# Patient Record
Sex: MALE | Race: WHITE | NOT HISPANIC OR LATINO | Employment: OTHER | URBAN - METROPOLITAN AREA
[De-identification: names, ages, dates, MRNs, and addresses within clinical notes are randomized per-mention and may not be internally consistent; named-entity substitution may affect disease eponyms.]

---

## 2017-03-27 ENCOUNTER — TRANSFERRED RECORDS (OUTPATIENT)
Dept: HEALTH INFORMATION MANAGEMENT | Facility: CLINIC | Age: 62
End: 2017-03-27

## 2017-03-28 ENCOUNTER — MEDICAL CORRESPONDENCE (OUTPATIENT)
Dept: HEALTH INFORMATION MANAGEMENT | Facility: CLINIC | Age: 62
End: 2017-03-28

## 2017-04-20 ENCOUNTER — ONCOLOGY VISIT (OUTPATIENT)
Dept: ONCOLOGY | Facility: CLINIC | Age: 62
End: 2017-04-20
Attending: GENETIC COUNSELOR, MS
Payer: COMMERCIAL

## 2017-04-20 DIAGNOSIS — Z85.46 HISTORY OF PROSTATE CANCER: ICD-10-CM

## 2017-04-20 DIAGNOSIS — Z80.0 FAMILY HISTORY OF PANCREATIC CANCER: Primary | ICD-10-CM

## 2017-04-20 PROCEDURE — 96040 ZZH GENETIC COUNSELING, EACH 30 MINUTES: CPT | Performed by: GENETIC COUNSELOR, MS

## 2017-04-20 NOTE — PROGRESS NOTES
4/20/2017    Referring Provider: Dean Nissen, MD    Presenting Information:   I met with Pradeep Junior today for genetic counseling at the Cancer Risk Management Program at the [facility] to discuss his family history of pancreatic cancer.  He is here today to review this history, cancer screening recommendations, and available genetic testing options.    Personal History:  Pradeep is a 61 year old male.  He was diagnosed with prostate cancer at age 51; treatment included prostatectomy.      Pradeep began having colonoscopies at the age of 51 and reports a history of fewer than 5 total colon polyps. His most recent colonoscopy in 2013 was reportedly normal and follow-up was recommended in 5 years.  He reports a history of several non-cancerous skin lesions on his head, back and chest.   He does not regularly do any other cancer screening at this time.  Pradeep is a non-smoker and reported no concerns regarding alcohol use.    Family History: (Please see scanned pedigree for detailed family history information)    Pradeep has a sister recently diagnosed with pancreatic cancer at 65.    Pradeep's father was diagnosed with pancreatic cancer at 58 and passed away at 59.    Pradeep's paternal grandmother had two sister's diagnosed with pancreatic cancer.      Pradeep's maternal grandfather was diagnosed with brain cancer at 76 and passed away at 77.    Of note, Pradeep reported that two of his sister's have been diagnosed with Gilbert's Syndrome    His maternal ethnicity is English/Scandinavian. His paternal ethnicity is Wolof.  There is no known Ashkenazi Samaritan ancestry on either side of his family.     Discussion:    Pradeep's family history of pancreatic cancer in three generations is suggestive of a hereditary cancer syndrome.    We reviewed the features of sporadic, familial, and hereditary cancers.  A detailed handout regarding these features and the information we discussed was provided to Pradeep at the end of our appointment  today and can be found in the after visit summary.  Topics included: inheritance pattern, cancer risks, cancer screening recommendations, and also risks, benefits and limitations of testing.    We discussed the natural history and genetics of several pancreatic cancer susceptibility genes/syndromes, including hereditary breast and ovarian cancer syndrome caused by mutations in the BRCA1 and BRCA2 genes.  We discussed that there are additional genes that could cause increased risk of pancreatic cancer.  As many of these genes present with overlapping features in a family, it would be reasonable for Pradeep to consider panel genetic testing to analyze multiple genes at once.    Based on his family history of pancreatic cancer, Pradeep meets current National Comprehensive Cancer Network (NCCN) criteria for genetic testing of BRCA1 and BRCA2.      We discussed that genetic testing for pancreatic cancer susceptibility genes is typically most informative when it is first performed on a family member with a personal history of pancreatic cancer.  In this situation, we discussed that Pradeep's sister, who was diagnosed with pancreatic cancer at age 65, would be the best person to test first.  Testing is available to Pradeep, but with limitations.   If Pradeep pursues testing at this time and receives a negative result, this does not rule out the possibility of a hereditary cancer syndrome in his family.  Pradeep expressed interest in proceeding with testing if it is covered by his insurance at a reasonable price, however plans to first speak with his sister about the possibility of testing prior to having his blood drawn.      We discussed that genetic testing is available for 13 genes associated with increased risk for pancreatic cancer: PancNext (APC, CHIKIS, BRCA1, BRCA2, CDKN2A, EPCAM, MLH1, MSH2, MSH6, PALB2, PMS2, STK11, and TP53).  Due to his personal history of prostate cancer at a young age, we also reviewed that genetic testing is  available for 14 genes associated with increased risk for prostate cancer: ProstateNext (CHIKIS, BRCA1, BRCA2, CHEK2, EPCAM, HOXB13, MLH1, MSH2, MSH6, NBN, PALB2, PMS2, RAD51D, and TP53).  Today Pradeep expressed interest in genetic testing which addresses his family history of pancreatic cancer (PancNext).    We discussed that most of the genes in the PancNext panel are associated with specific hereditary cancer syndromes and have published management guidelines: Hereditary Breast and Ovarian Cancer syndrome (BRCA1, BRCA2), Mccoy syndrome (EPCAM, MLH1, MSH2, MSH6, PMS2), Familial Atypical Multiple Mole Melanoma syndrome (CDKN2A), Li Fraumeni syndrome (TP53), Familial Adenomatous Polyposis (APC), and Peutz-Jeghers syndrome (STK11).   The remaining genes (CHIKIS, PALB2) are associated with increased pancreatic cancer risk and may allow us to make medical recommendations when mutations are identified.  These genes do have guidelines for breast cancer risk management for women.  Pradeep elected to pursue a benefits analysis through [employer] to better understand his out of pocket cost for the PancNext gene panel.  He was provided with a detailed brochure from [employer] explaining the PancNext testing.    Due to Pradeep s family history of pancreatic cancer (including two first degree relatives), screening for pancreatic cancer may be considered.  The International Cancer of the Pancreas Screening Consortium has recommended pancreatic cancer screening with endoscopic ultrasonography (EUS) and/or MRI/magnetic resonance cholangiopancreatography for individuals who have a first degree relative and one additional family member diagnosed with pancreatic cancer (Sam et al, Gut 2013; 62: 339-347)(Panchito S, et al., Am J Gastroenterol 2015; 110:223-262).  We discussed the option of Pradeep meeting with a provider from the Cancer Risk Management Program to facilitate this screening with the option to add additional screening if  indicated by genetic test results. A referral was made to see DAMIEN Gill, LUCAS, for this service.    We discussed that the information from genetic testing may determine additional cancer screening recommendations (i.e. more frequent colonoscopies, pancreatic cancer screening, annual dermatologic exams, etc.) for Pradeep and his relatives.  These recommendations will be discussed in detail if/when genetic testing is completed.    Plan:  1) Today Pradeep elected to pursue a benefits analysis through [employer] to better understand his out of pocket cost for the PancNext gene panel.  2) Pradeep will be contacted with his expected out of pocket cost when available.  3) If Pradeep is comfortable with this estimated out of pocket cost, he will return to clinic to sign the consent form and have his blood drawn.    Face to face time: 45 minutes    Janina Cordova MS, [facility] [place] [facility]  Certified Genetic Counselor  [phone]

## 2017-04-20 NOTE — LETTER
Cancer Risk Management  Program Locations    [facility]  Mailing Address  Cancer Risk Management Program  [facility]  [address]  [place] [address]    New patient appointments  [phone]  April 20, 2017    Pradeep Junior  [address] [facility] [address]      Dear Pradeep,    It was a pleasure meeting with you at the [facility] on 4/20/2017.  Here is a copy of the progress note from your recent genetic counseling visit to the Cancer Risk Management Program.  If you have any additional questions, please feel free to call.    Referring Provider: Dean Nissen, MD    Presenting Information:   I met with Pradeep Junior today for genetic counseling at the Cancer Risk Management Program at the [facility] to discuss his family history of pancreatic cancer.  He is here today to review this history, cancer screening recommendations, and available genetic testing options.    Personal History:  Pradeep is a 61 year old male.  He was diagnosed with prostate cancer at age 51; treatment included prostatectomy.      Pradeep began having colonoscopies at the age of 51 and reports a history of fewer than 5 total colon polyps. His most recent colonoscopy in 2013 was reportedly normal and follow-up was recommended in 5 years.  He reports a history of several non-cancerous skin lesions on his head, back and chest.   He does not regularly do any other cancer screening at this time.  Pradeep is a non-smoker and reported no concerns regarding alcohol use.    Family History: (Please see scanned pedigree for detailed family history information)    Pradeep has a sister recently diagnosed with pancreatic cancer at 65.    Pradeep's father was diagnosed with pancreatic cancer at 58 and passed away at 59.    Pradeep's  paternal grandmother had two sister's diagnosed with pancreatic cancer.      Pardeep's maternal grandfather was diagnosed with brain cancer at 76 and passed away at 77.    Of note, Pradeep reported that two of his sister's have been diagnosed with Gilbert's Syndrome    His maternal ethnicity is English/Scandinavian. His paternal ethnicity is Setswana.  There is no known Ashkenazi Alevism ancestry on either side of his family.     Discussion:    Pradeep's family history of pancreatic cancer in three generations is suggestive of a hereditary cancer syndrome.    We reviewed the features of sporadic, familial, and hereditary cancers.  A detailed handout regarding these features and the information we discussed was provided to Pradeep at the end of our appointment today and can be found in the after visit summary.  Topics included: inheritance pattern, cancer risks, cancer screening recommendations, and also risks, benefits and limitations of testing.    We discussed the natural history and genetics of several pancreatic cancer susceptibility genes/syndromes, including hereditary breast and ovarian cancer syndrome caused by mutations in the BRCA1 and BRCA2 genes.  We discussed that there are additional genes that could cause increased risk of pancreatic cancer.  As many of these genes present with overlapping features in a family, it would be reasonable for Pradeep to consider panel genetic testing to analyze multiple genes at once.    Based on his family history of pancreatic cancer, Pradeep meets current National Comprehensive Cancer Network (NCCN) criteria for genetic testing of BRCA1 and BRCA2.      We discussed that genetic testing for pancreatic cancer susceptibility genes is typically most informative when it is first performed on a family member with a personal history of pancreatic cancer.  In this situation, we discussed that Pradeep's sister, who was diagnosed with pancreatic cancer at age 65, would be the best person to test first.   Testing is available to Pradeep, but with limitations.   If Pradeep pursues testing at this time and receives a negative result, this does not rule out the possibility of a hereditary cancer syndrome in his family.  Pradeep expressed interest in proceeding with testing if it is covered by his insurance at a reasonable price, however plans to first speak with his sister about the possibility of testing prior to having his blood drawn.      We discussed that genetic testing is available for 13 genes associated with increased risk for pancreatic cancer: PancNext (APC, CHIKIS, BRCA1, BRCA2, CDKN2A, EPCAM, MLH1, MSH2, MSH6, PALB2, PMS2, STK11, and TP53).  Due to his personal history of prostate cancer at a young age, we also reviewed that genetic testing is available for 14 genes associated with increased risk for prostate cancer: ProstateNext (CHIKIS, BRCA1, BRCA2, CHEK2, EPCAM, HOXB13, MLH1, MSH2, MSH6, NBN, PALB2, PMS2, RAD51D, and TP53).  Today Pradeep expressed interest in genetic testing which addresses his family history of pancreatic cancer (PancNext).    We discussed that most of the genes in the PancNext panel are associated with specific hereditary cancer syndromes and have published management guidelines: Hereditary Breast and Ovarian Cancer syndrome (BRCA1, BRCA2), Mccoy syndrome (EPCAM, MLH1, MSH2, MSH6, PMS2), Familial Atypical Multiple Mole Melanoma syndrome (CDKN2A), Li Fraumeni syndrome (TP53), Familial Adenomatous Polyposis (APC), and Peutz-Jeghers syndrome (STK11).   The remaining genes (CHIKIS, PALB2) are associated with increased pancreatic cancer risk and may allow us to make medical recommendations when mutations are identified.  These genes do have guidelines for breast cancer risk management for women.  Pradeep elected to pursue a benefits analysis through [employer] to better understand his out of pocket cost for the PancNext gene panel.  He was provided with a detailed brochure from [employer] explaining the  PancNext testing.    Due to Pradeep s family history of pancreatic cancer (including two first degree relatives), screening for pancreatic cancer may be considered.  The International Cancer of the Pancreas Screening Consortium has recommended pancreatic cancer screening with endoscopic ultrasonography (EUS) and/or MRI/magnetic resonance cholangiopancreatography for individuals who have a first degree relative and one additional family member diagnosed with pancreatic cancer (Sam et al, Gut 2013; 62: 339-347)(Panchito S, et al., Am J Gastroenterol 2015; 110:223-262).  We discussed the option of Pradeep meeting with a provider from the Cancer Risk Management Program to facilitate this screening with the option to add additional screening if indicated by genetic test results. A referral was made to see DAMIEN Gill, LUCAS, for this service.    We discussed that the information from genetic testing may determine additional cancer screening recommendations (i.e. more frequent colonoscopies, pancreatic cancer screening, annual dermatologic exams, etc.) for Pradeep and his relatives.  These recommendations will be discussed in detail if/when genetic testing is completed.    Plan:  1) Today Pradeep elected to pursue a benefits analysis through [employer] to better understand his out of pocket cost for the PancNext gene panel.  2) Pradeep will be contacted with his expected out of pocket cost when available.  3) If Pradeep is comfortable with this estimated out of pocket cost, he will return to clinic to sign the consent form and have his blood drawn.    Janina Cordova MS, [place]  Certified Genetic Counselor  [phone]

## 2017-04-20 NOTE — MR AVS SNAPSHOT
After Visit Summary   4/20/2017    Pradeep Junior    MRN: [id number]           Patient Information     Date Of Birth          1955        Visit Information        Provider Department      4/20/2017 11:15 AM Janina Cordova [facility] Cancer Risk Management Program        Today's Diagnoses     Family history of pancreatic cancer    -  1    History of prostate cancer           Follow-ups after your visit        Additional Services     CANCER RISK MGMT/CANCER GENETIC COUNSELING REFERRAL       Your provider has referred you to the Cancer Risk Management Program to meet with STEPHANE Gill to discuss high risk pancreatic cancer screening    Reason for Referral: Family history of pancreatic cancer, including two first degree relatives (sister diagnosed at 65, father diagnosed at 58)    We have a sent a notice to a staff member of the Cancer Risk Management Program to give you a call to assist with scheduling your appointment.  You may also call  [phone] ([phone]) to initiate scheduling.    Please be aware that coverage of these services is subject to the terms and limitations of your health insurance plan.  Call member services at your health plan with any benefit or coverage questions.      Please bring the completed family history sheet to your appointment in addition to any available outside medical records documenting your cancer diagnosis.                  Who to contact     If you have questions or need follow up information about today's clinic visit or your schedule please contact CANCER RISK MANAGEMENT PROGRAM directly at [phone].  Normal or non-critical lab and imaging results will be communicated to you by MyChart, letter or phone within 4 business days after the clinic has received the results. If you do not hear from us within 7 days, please contact the clinic through MyChart or phone. If you have a critical or abnormal lab result, we will notify you by phone as soon  "as possible.  Submit refill requests through [employer] or call your pharmacy and they will forward the refill request to us. Please allow 3 business days for your refill to be completed.          Additional Information About Your Visit        [employer] Information     [employer] lets you send messages to your doctor, view your test results, renew your prescriptions, schedule appointments and more. To sign up, go to www.[facility]/[employer] . Click on \"Log in\" on the left side of the screen, which will take you to the Welcome page. Then click on \"Sign up Now\" on the right side of the page.     You will be asked to enter the access code listed below, as well as some personal information. Please follow the directions to create your username and password.     Your access code is: [email or web address]  Expires: 2017 12:26 PM     Your access code will  in 90 days. If you need help or a new code, please call your [place] clinic or [phone].        Care EveryWhere ID     This is your Care EveryWhere ID. This could be used by other organizations to access your [place] medical records  [id number]         Blood Pressure from Last 3 Encounters:   No data found for BP    Weight from Last 3 Encounters:   No data found for Wt              We Performed the Following     CANCER RISK MGMT/CANCER GENETIC COUNSELING REFERRAL        Primary Care Provider    None Specified       No primary provider on file.        Thank you!     Thank you for choosing CANCER RISK MANAGEMENT PROGRAM  for your care. Our goal is always to provide you with excellent care. Hearing back from our patients is one way we can continue to improve our services. Please take a few minutes to complete the written survey that you may receive in the mail after your visit with us. Thank you!             Your Updated Medication List - Protect others around you: Learn how to safely use, store and throw away your medicines at www.disposemymeds.org.      Notice  As of " 4/20/2017 12:26 PM    You have not been prescribed any medications.

## 2017-04-24 NOTE — PATIENT INSTRUCTIONS
Assessing Cancer Risk  Cancer is a common disease.  It is expected that one in every three people in the [place] will develop cancer over their lifetime.  The vast majority of cancers are considered sporadic and not primarily due to an inherited factor.  Individuals can develop cancer due to aging, chance events, environmental exposures or lifestyles.  Mainly inherited factors (altered cancer susceptibility genes) are responsible for only a small number of cancer cases, approximately 5-10%.    There are several features that are likely to be present in a family that has an inherited alteration in a cancer susceptibility gene. These include:    Several people with the same or related types of cancer    Cancers diagnosed at a young age (before age 50)    Individuals with more than one primary cancer    Multiple generations of the family affected with cancer    Families that do not exhibit most of these features are unlikely to carry an altered cancer susceptibility gene, and genetic testing is not usually warranted.      Categories of Cancer        In some families, we see more cancer than we would expect based on chance alone.  It is likely that this familial clustering of cancer is due to a combination of environmental and minor genetic factors that are not yet fully understood.  Individuals from these families are likely to be at some increased risk for cancer.  In these families, we cannot conclusively rule out the possibility that inherited cancer is present. For this reason, more frequent cancer screening may be recommended even if genetic testing is not.    Resources  American Cancer Society (ACS) cancer.org     Please call us if you have any questions or concerns.     Cancer Risk Management Program [phone] ([phone])  ? Migdalia Kirby MS, [facility]  [phone]  ? Magalys Choi MS, [facility]  [phone]  ? Eleonora Burris MS, [facility]  [phone]  ? Janina Cordova, MS, [facility]  [phone]  ? Krista Wallace MS,  [facility]  [phone]

## 2017-05-11 ENCOUNTER — ONCOLOGY VISIT (OUTPATIENT)
Dept: ONCOLOGY | Facility: CLINIC | Age: 62
End: 2017-05-11
Attending: CLINICAL NURSE SPECIALIST
Payer: COMMERCIAL

## 2017-05-11 VITALS
HEART RATE: 61 BPM | DIASTOLIC BLOOD PRESSURE: 58 MMHG | RESPIRATION RATE: 16 BRPM | OXYGEN SATURATION: 97 % | WEIGHT: 142 LBS | TEMPERATURE: 97.7 F | SYSTOLIC BLOOD PRESSURE: 143 MMHG

## 2017-05-11 DIAGNOSIS — Z80.0 FAMILY HISTORY OF PANCREATIC CANCER: Primary | ICD-10-CM

## 2017-05-11 PROCEDURE — 99202 OFFICE O/P NEW SF 15 MIN: CPT | Performed by: CLINICAL NURSE SPECIALIST

## 2017-05-11 PROCEDURE — 99211 OFF/OP EST MAY X REQ PHY/QHP: CPT

## 2017-05-11 RX ORDER — ASPIRIN 325 MG
325 TABLET ORAL DAILY
COMMUNITY

## 2017-05-11 ASSESSMENT — PAIN SCALES - GENERAL: PAINLEVEL: NO PAIN (0)

## 2017-05-11 NOTE — LETTER
Cancer Risk Management  Program Locations    [facility]  [facility]  [facility]  [facility]  Mailing Address  Cancer Risk Management Program  [facility]  [address]  [place] [address]    New patient appointments  [phone]  May 11, 2017    Pradeep Junior  [address] [facility] [address]      Dear Pradeep,    It was a pleasure meeting with you today.  Below is a copy of my note from our visit   Please feel free to contact me if you have any questions or concerns.    Oncology Risk Management Consultation:  Date on this visit: 5/11/2017    Pradeep Junior is referred by Janina Cordova, Certified Genetic Counselor, for an oncology risk management consultation. He requires evaluation for his risk of cancer secondary to having a family history of pancreatic cancer in his father, sister and 4 paternal great aunts.      Primary Physician: No primary care provider on file.     History Of Present Illness:  Mr. Junior is a very pleasant, healthy 61 year old male who presents with family history of pancreatic cancer.     Genetic testing:  No genetic testing to date. He visited with Janina Cordova, Certified Genetic Counselor, recently, who assessed his family history.    Of note, his sister, who had pancreatic cancer at age 65 (no history of alcohol use or smoking) is waiting for genetic test results from a 32-gene [employer] genetics panel.  Depending on those results, Pradeep will decide whether he should have testing and for which genetic mutation.    Pertinent history:  GENERAL: No change in weight, sleep or appetite.  Normal energy.  No fever or chills  EYES: Positive for strabismus surgery in childhood.  ENT: No problems with ears, nose or throat.  No difficulty swallowing.  RESP: No coughing, wheezing or shortness of breath  CV: No chest pains or  palpitations. Positive for history of hyperlipidemia.  GI: No nausea, vomiting,  heartburn, epigastric or abdominal pain, diarrhea, constipation or change in bowel habits  : Positive for prostate cancer in ; s/p radical prostatectomy with nerve sparing technique. Positive for history of kidney stones.No current urinary frequency or dysuria, bladder or kidney problems  MUSCULOSKELETAL: Positive for history of Paget's disease of bone. No significant muscle or joint pains  NEUROLOGIC: No headaches, numbness, tingling, weakness, problems with balance or coordination  PSYCHIATRIC: No problems with anxiety, depression or mental health  HEME/IMMUNE/ALLERGY: No history of bleeding or clotting problems or anemia.  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     Past Surgical History:   Procedure Laterality Date     C LAPAROSCOPY, SURGICAL PROSTATECTOMY, RETROPUBIC RADICAL, W/NERVE SPARING       COLONOSCOPY  10/20/2013     STRABISMUS SURGERY  89       Allergies:  Allergies as of 2017     (No Known Allergies)       Current Medications:  Current Outpatient Prescriptions   Medication Sig Dispense Refill     aspirin 325 MG tablet Take 325 mg by mouth daily       ATORVASTATIN CALCIUM PO Take 40 mg by mouth daily          Family History:  Family History   Problem Relation Age of Onset     Pancreatic Cancer Father 58      at 59     Pancreatic Cancer Sister 65     nonsmoker, nondrinker, genetic test results pending (2017)     Other - See  Comments Sister      Gilbert's Syndrome     Other - See Comments Brother 54     pancreatic cyst     Colon Polyps Brother 25     Brain Cancer Maternal Grandfather 76      at 77     Pancreatic Cancer Other      4 paternal great aunts with pancreatic cancer ranging grom 40s to 60s     DIABETES Daughter      Type 1 diabetes     Other - See Comments Sister      Praneethbert's Syndrome     Colon Polyps Sister      Colon Polyps Brother 66       Social History:  Social History     Social History     Marital status:      Spouse name: Mary     Number of children: 2     Years of education: N/A     Occupational History     Realtor      Social History Main Topics     Smoking status: Current Some Day Smoker     Types: Cigars     Start date: 2007     Smokeless tobacco: Never Used      Comment: 2 cigars only while golfing, states he doesn't need help quitting     Alcohol use 1.8 oz/week     3 Glasses of wine per week     Drug use: No     Sexual activity: Not on file     Other Topics Concern     Not on file     Social History Narrative     No narrative on file       Physical Exam:  /58 (BP Location: Right arm, Patient Position: Chair, Cuff Size: Adult Regular)  Pulse 61  Temp 97.7  F (36.5  C) (Oral)  Resp 16  Wt 64.4 kg (142 lb)  SpO2 97%    GENERAL APPEARANCE: healthy, alert and no distress  PSYCHOSOCIAL: Judgement and insight: Normal. Orientation to person, place and time. Recent and remote memory. Normal mood and affect.    Laboratory/Imaging Studies  No results found for this or any previous visit.    ASSESSMENT  Today, Pradeep has no immediate complaints of GI issues.  He is interested in having an EUS and has looked up information about it online.  He will follow up with Janina Cordova if his sister is positive for a genetic mutation, as she is having 32 genes tested for mutations.     Pradeep has smoked cigars while golfing for the last 7 years or so; he states he only has 2 per day and only during the summer  months. We discussed that it would be to his advantage to quit this habit and he states he will and declined any type of support for quitting.  He drinks about 3 glasses of wine per week. Of note in his family history, his daughter has Type 1 Diabetes; there is no reported pancreatitis in his immediate siblings and no other diabetes that he is aware of at this time.    We reviewed his eligibility for pancreatic screening, based on the CAPS guidelines. I am referring him to Dr. Oliverio Tapia for a consultation and plan.    I spent 20 minutes with the patient with greater that 50% of it in counseling and coordinating care as documented above.    Janine Alfredo, APRN-CNS, OCN, ANG-BC  Clinical Nurse Specialist  Cancer Risk Management Program  [address]  [place] [address]    phone:  [phone]  Pager: [phone]  fax: [phone]    Cc: Dr. Oliverio Cordova, Certified Genetic Counselor        Oncology Rooming Note    May 11, 2017 11:05 AM   Pradeep Junior is a 61 year old male who presents for:    Chief Complaint   Patient presents with     Oncology Clinic Visit     screening for CA     Initial Vitals: There were no vitals taken for this visit. There is no height or weight on file to calculate BMI. There is no height or weight on file to calculate BSA.  Data Unavailable Comment: Data Unavailable   No LMP for male patient.  Allergies reviewed: Yes  Medications reviewed: Yes  May need to check atrovastatin drug.   Medications: Medication refills not needed today.  Pharmacy name entered into EPIC: Data Unavailable    Clinical concerns: none Janine MENDOZA  was notified.    10 minutes for nursing intake (face to face time)     Mile Quinn MA            \

## 2017-05-11 NOTE — PROGRESS NOTES
Oncology Risk Management Consultation:  Date on this visit: 5/11/2017    Pradeep Junior is referred by Janina Cordova, Certified Genetic Counselor, for an oncology risk management consultation. He requires evaluation for his risk of cancer secondary to having a family history of pancreatic cancer in his father, sister and 4 paternal great aunts.      Primary Physician: No primary care provider on file.     History Of Present Illness:  Mr. Junior is a very pleasant, healthy 61 year old male who presents with family history of pancreatic cancer.     Genetic testing:  No genetic testing to date. He visited with Janina Cordova, Certified Genetic Counselor, recently, who assessed his family history.    Of note, his sister, who had pancreatic cancer at age 65 (no history of alcohol use or smoking) is waiting for genetic test results from a 32-gene [employer] genetics panel.  Depending on those results, Pradeep will decide whether he should have testing and for which genetic mutation.    Pertinent history:  GENERAL: No change in weight, sleep or appetite.  Normal energy.  No fever or chills  EYES: Positive for strabismus surgery in childhood.  ENT: No problems with ears, nose or throat.  No difficulty swallowing.  RESP: No coughing, wheezing or shortness of breath  CV: No chest pains or palpitations. Positive for history of hyperlipidemia.  GI: No nausea, vomiting,  heartburn, epigastric or abdominal pain, diarrhea, constipation or change in bowel habits  : Positive for prostate cancer in 2007; s/p radical prostatectomy with nerve sparing technique. Positive for history of kidney stones.No current urinary frequency or dysuria, bladder or kidney problems  MUSCULOSKELETAL: Positive for history of Paget's disease of bone. No significant muscle or joint pains  NEUROLOGIC: No headaches, numbness, tingling, weakness, problems with balance or coordination  PSYCHIATRIC: No problems with anxiety, depression or mental  health  HEME/IMMUNE/ALLERGY: No history of bleeding or clotting problems or anemia.  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     Past Surgical History:   Procedure Laterality Date     C LAPAROSCOPY, SURGICAL PROSTATECTOMY, RETROPUBIC RADICAL, W/NERVE SPARING       COLONOSCOPY  10/20/2013     STRABISMUS SURGERY         Allergies:  Allergies as of 2017     (No Known Allergies)       Current Medications:  Current Outpatient Prescriptions   Medication Sig Dispense Refill     aspirin 325 MG tablet Take 325 mg by mouth daily       ATORVASTATIN CALCIUM PO Take 40 mg by mouth daily          Family History:  Family History   Problem Relation Age of Onset     Pancreatic Cancer Father 58      at 59     Pancreatic Cancer Sister 65     nonsmoker, nondrinker, genetic test results pending (2017)     Other - See Comments Sister      Gilbert's Syndrome     Other - See Comments Brother 54     pancreatic cyst     Colon Polyps Brother 25     Brain Cancer Maternal Grandfather 76      at 77     Pancreatic Cancer Other      4 paternal great aunts with pancreatic cancer ranging grom 40s to 60s     DIABETES Daughter      Type 1 diabetes     Other - See Comments Sister      Gilbert's Syndrome     Colon Polyps Sister      Colon Polyps Brother 66       Social History:  Social History     Social History     Marital status:      Spouse name: Mary     Number of children: 2     Years of education: N/A     Occupational History     Realtor       Social History Main Topics     Smoking status: Current Some Day Smoker     Types: Cigars     Start date: 5/11/2007     Smokeless tobacco: Never Used      Comment: 2 cigars only while golfing, states he doesn't need help quitting     Alcohol use 1.8 oz/week     3 Glasses of wine per week     Drug use: No     Sexual activity: Not on file     Other Topics Concern     Not on file     Social History Narrative     No narrative on file       Physical Exam:  /58 (BP Location: Right arm, Patient Position: Chair, Cuff Size: Adult Regular)  Pulse 61  Temp 97.7  F (36.5  C) (Oral)  Resp 16  Wt 64.4 kg (142 lb)  SpO2 97%    GENERAL APPEARANCE: healthy, alert and no distress  PSYCHOSOCIAL: Judgement and insight: Normal. Orientation to person, place and time. Recent and remote memory. Normal mood and affect.    Laboratory/Imaging Studies  No results found for this or any previous visit.    ASSESSMENT  Today, Pradeep has no immediate complaints of GI issues.  He is interested in having an EUS and has looked up information about it online.  He will follow up with Janina Cordova if his sister is positive for a genetic mutation, as she is having 32 genes tested for mutations.     Pradeep has smoked cigars while golfing for the last 7 years or so; he states he only has 2 per day and only during the summer months. We discussed that it would be to his advantage to quit this habit and he states he will and declined any type of support for quitting.  He drinks about 3 glasses of wine per week. Of note in his family history, his daughter has Type 1 Diabetes; there is no reported pancreatitis in his immediate siblings and no other diabetes that he is aware of at this time.    We reviewed his eligibility for pancreatic screening, based on the CAPS guidelines. I am referring him to Dr. Oliverio Tapia for a consultation and plan.    I spent 20 minutes with the patient with greater that 50% of it in counseling and coordinating care as  documented above.    Janine Alfredo, APRN-CNS, OCN, ANG-BC  Clinical Nurse Specialist  Cancer Risk Management Program  [address]  [place] [address]    phone:  [phone]  Pager: [phone]  fax: [phone]    Cc: Dr. Oliverio Cordova, Certified Genetic Counselor

## 2017-05-11 NOTE — PROGRESS NOTES
Oncology Rooming Note    May 11, 2017 11:05 AM   Pradeep Junior is a 61 year old male who presents for:    Chief Complaint   Patient presents with     Oncology Clinic Visit     screening for CA     Initial Vitals: There were no vitals taken for this visit. There is no height or weight on file to calculate BMI. There is no height or weight on file to calculate BSA.  Data Unavailable Comment: Data Unavailable   No LMP for male patient.  Allergies reviewed: Yes  Medications reviewed: Yes  May need to check atrovastatin drug.   Medications: Medication refills not needed today.  Pharmacy name entered into EPIC: Data Unavailable    Clinical concerns: none Janine MENDOZA  was notified.    10 minutes for nursing intake (face to face time)     Mile Quinn MA            \

## 2017-05-11 NOTE — MR AVS SNAPSHOT
"              After Visit Summary   2017    Pradeep Junior    MRN: [id number]           Patient Information     Date Of Birth          1955        Visit Information        Provider Department      2017 11:00 AM Janine Alfredo APRN CNS Cancer Risk Management Program        Today's Diagnoses     Family history of pancreatic cancer    -  1       Follow-ups after your visit        Additional Services     GASTROENTEROLOGY ADULT REF CONSULT ONLY       Family history of pancreatic cancer in sister at 65, father at 58 ( at 59), paternal great aunt (three generations).   No genetic testing done to date. Patient considering cost of genetic testing at this time.                  Who to contact     If you have questions or need follow up information about today's clinic visit or your schedule please contact CANCER RISK MANAGEMENT PROGRAM directly at [phone].  Normal or non-critical lab and imaging results will be communicated to you by GoSpotCheckhart, letter or phone within 4 business days after the clinic has received the results. If you do not hear from us within 7 days, please contact the clinic through GoSpotCheckhart or phone. If you have a critical or abnormal lab result, we will notify you by phone as soon as possible.  Submit refill requests through Vatler or call your pharmacy and they will forward the refill request to us. Please allow 3 business days for your refill to be completed.          Additional Information About Your Visit        MyChart Information     Vatler lets you send messages to your doctor, view your test results, renew your prescriptions, schedule appointments and more. To sign up, go to [email or web address] . Click on \"Log in\" on the left side of the screen, which will take you to the Welcome page. Then click on \"Sign up Now\" on the right side of the page.     You will be asked to enter the access code listed below, as well as some personal information. Please follow the " directions to create your username and password.     Your access code is: [email or web address]  Expires: 2017 12:26 PM     Your access code will  in 90 days. If you need help or a new code, please call your [facility] clinic or [phone].        Care EveryWhere ID     This is your Care EveryWhere ID. This could be used by other organizations to access your [facility] medical records  [id number]        Your Vitals Were     Pulse Temperature Respirations Pulse Oximetry          61 97.7  F (36.5  C) (Oral) 16 97%         Blood Pressure from Last 3 Encounters:   17 143/58    Weight from Last 3 Encounters:   17 64.4 kg (142 lb)              We Performed the Following     GASTROENTEROLOGY ADULT REF CONSULT ONLY        Primary Care Provider    None Specified       No primary provider on file.        Thank you!     Thank you for choosing CANCER RISK MANAGEMENT PROGRAM  for your care. Our goal is always to provide you with excellent care. Hearing back from our patients is one way we can continue to improve our services. Please take a few minutes to complete the written survey that you may receive in the mail after your visit with us. Thank you!             Your Updated Medication List - Protect others around you: Learn how to safely use, store and throw away your medicines at www.disposemymeds.org.          This list is accurate as of: 17 12:28 PM.  Always use your most recent med list.                   Brand Name Dispense Instructions for use    aspirin 325 MG tablet      Take 325 mg by mouth daily       ATORVASTATIN CALCIUM PO      Take 40 mg by mouth daily

## 2017-05-11 NOTE — Clinical Note
I am sending you this jaime gentleman who has a father and sister with pancreatic cancer. Father . Sister's genetic test results pending. Also family history of 4 paternal great aunts with pancreatic cancer. Red's Syndrome in 2 sisters. Brother w/pancreatic cyst on EUS. Daughter w/Type 1 Diabetes. No pancreatitis in family we know of.  No personal symptoms. Great livan. Wants screening.   Thank you. One of these days we'll have to catch up.  mentioned that he'd like to do some research on pancreas and genetics - maybe you 2 could collaborate?    Janine Alfredo, APRN-CNS, OCN, ANG-BC Clinical Nurse Specialist Cancer Risk Management Program [address] [place] [address]  phone:  [phone] Pager: [phone] fax: [phone]

## 2017-05-15 ENCOUNTER — CARE COORDINATION (OUTPATIENT)
Dept: GASTROENTEROLOGY | Facility: CLINIC | Age: 62
End: 2017-05-15

## 2017-05-15 DIAGNOSIS — Z80.0 FAMILY HISTORY OF PANCREATIC CANCER: Primary | ICD-10-CM

## 2017-05-15 NOTE — PROGRESS NOTES
Case reviewed  Contrasted CT of the abdomen, pancreas protcol  Non urgent clinic visit with probable EUS to follow  Thanks    Oliverio Tapia MD PhD  Director of Endoscopy   of Medicine  Interventional and Therapeutic Endoscopy    [facility]  Division of Gastroenterology and Hepatology  [facility] [address]  [address], [place] [address]    New Consultations  [phone]  Procedure Scheduling [phone]  Clinical Nurse Coordinator [phone]  Clinical Fax   [phone]  Administrative   [phone]  Administrative Fax  [phone]

## 2017-05-15 NOTE — PROGRESS NOTES
Advanced Endoscopy Clinic Intake:    Referring provider: Dr. Janine Alfredo at [facility]  Dr. Janine Alfredo, ph: [phone], Fax: [phone]     Referred to: [facility] Advanced Endoscopy providers   Requested provider (if specified): Dr. Tapia     Referral Received: 5/11/2017    Records received: In EPIC     MD review date:   Sent to be reviewed by Dr. Tapia                            Requested procedure: Clinic Visit    Evaluation for: Family History of Pancreatic Cancer     Clinical History (per RN review of records provided):   5/11/2017 Per Dr. Alfredo's notes: He requires evaluation for his risk of cancer secondary to having a family history of pancreatic cancer in his father, sister and 4 paternal great aunts.     Recommendations/Orders:    Patient aware of request for clin consultation and ok to be contacted to schedule. Yes           Per recommendations by Dr. Tapia:  1. CT panc protocol   2. Non-urgent clinic visit   3. Probable EUS to follow.     Order placed and sent to scheduling.     Called Pradeep to explain plan, he is amenable to plan and would like to proceed. He is aware someone from scheduling will call him with dates and times.   Nereida BOYD, RN Coordinator  Dr. Wang, Dr. Tapia & Dr. Fritz  Pancreas~Biliary  [phone] #4

## 2017-06-13 ENCOUNTER — TELEPHONE (OUTPATIENT)
Dept: GASTROENTEROLOGY | Facility: CLINIC | Age: 62
End: 2017-06-13

## 2017-06-13 NOTE — TELEPHONE ENCOUNTER
Pradeep is informed that he is scheduled on 8/25 at 8:45 AM for a clinic consult.  He knows he is scheduled for an EUS on 08/28/ at 2 PM with an arrival time of 12 PM.  Offered to get Pradeep scheduled for a PAC appointment after clinic consult in preparation for his procedure but he declined and plans to get one done locally.   He would also like to have his CT done locally. Sent in basket for Nereida ALEJANDRO.  All instructions will be sent to Pradeep at his address listed in EPIC.     06/13/2017  243p

## 2017-06-15 ENCOUNTER — CARE COORDINATION (OUTPATIENT)
Dept: GASTROENTEROLOGY | Facility: CLINIC | Age: 62
End: 2017-06-15

## 2017-06-15 NOTE — PROGRESS NOTES
Pradeep would like his CT sent to his PCP Dr. Nissen at [facility] [place]. Phone: [phone], Fx [phone]  Faxed Ct order to his PCP. Advised him to get CT done in July since his procedure is not until August.     Nereida BOYD, RN Coordinator  Dr. Wang, Dr. Tapia & Dr. Feng   Pancreas~Biliary  [phone] #4

## 2017-06-15 NOTE — PROGRESS NOTES
Message left for Pradeep: per scheduling he would prefer to get his CT done locally. There is no PCP listed in his records.   Left message for him to call back to find out PCP and also if that is where he would like his CT sent.   Advised will need CT done prior to moving forward with anything else in regards to his referral.   Contact information left for him to call.     Nereida BOYD RN Coordinator  Dr. Wang, Dr. Tapia & Dr. Feng   Pancreas~Biliary  [phone] #4

## 2017-06-16 NOTE — PROGRESS NOTES
Pradeep called and said CT order was not received, new fax number given to send it to. Fax: [phone]    Resent and Pradeep will call if there are problems.     Nereida BOYD RN Coordinator  Dr. Wang, Dr. Tapia & Dr. Feng   Pancreas~Biliary  [phone] #4

## 2017-07-11 ENCOUNTER — TRANSFERRED RECORDS (OUTPATIENT)
Dept: HEALTH INFORMATION MANAGEMENT | Facility: CLINIC | Age: 62
End: 2017-07-11

## 2017-07-11 ENCOUNTER — TELEPHONE (OUTPATIENT)
Dept: ONCOLOGY | Facility: CLINIC | Age: 62
End: 2017-07-11

## 2017-07-19 NOTE — TELEPHONE ENCOUNTER
7/11/2017    I called Pradeep today in order to discuss the pre-authorization for genetic testing that we had submitted through [employer]. After meeting with Pradeep on 4/20/2017, prior authorization was submitted through [employer] to better understand if genetic testing of the PancNext gene panel would be covered through his insurance company. Per [employer], Pradeep's expected out of pocket cost after a pre authorization is approved through his insurance company will be $1724.27. His insurance has currently denied coverage for this testing. I called Pradeep today in order to discuss the status of his insurance authorization and possible next steps.  Pradeep stated that his sister, who was diagnosed with pancreatic cancer, recently pursued genetic testing for a 30 gene panel which was negative/normal.  Pradeep plans to obtain a copy of her test report for review.      We do not have an explanation for Pradeep's family history of pancreatic cancer.  Because of that, it is important that he continue with cancer screening based on his personal and family history.  Following his genetic counseling visit on 4/20/2017, Pradeep met with Janine Alfredo, Clinical Nurse Specialist, to discuss screening and management options for pancreatic cancer. He now plans to follow up with Dr. Oliverio Tapia for a consultation and plan.  Pradeep felt comfortable declining genetic testing at this time.    Genetic testing is rapidly advancing, and new cancer susceptibility genes will most likely be identified in the future.  Therefore, I encouraged Pradeep to contact me annually or if there are changes in his personal or family history.  This may change how we assess his cancer risk, screening, and the testing we would offer.     Janina Cordova MS, [facility]  Certified Genetic Counselor  [phone]

## 2017-07-20 ENCOUNTER — DOCUMENTATION ONLY (OUTPATIENT)
Dept: GASTROENTEROLOGY | Facility: CLINIC | Age: 62
End: 2017-07-20

## 2017-07-20 NOTE — PROGRESS NOTES
Received CT on disc from [facility] along with final read. Will send disc to be downloaded and will scan in final read for CT.     Nereida BOYD RN Coordinator  Dr. Wang, Dr. Tapia & Dr. Feng   Advanced Endoscopy  [phone]

## 2017-08-20 ASSESSMENT — ENCOUNTER SYMPTOMS
NAIL CHANGES: 0
POOR WOUND HEALING: 0
SKIN CHANGES: 0

## 2017-08-25 ENCOUNTER — OFFICE VISIT (OUTPATIENT)
Dept: GASTROENTEROLOGY | Facility: CLINIC | Age: 62
End: 2017-08-25

## 2017-08-25 VITALS
TEMPERATURE: 97.5 F | SYSTOLIC BLOOD PRESSURE: 139 MMHG | WEIGHT: 138.4 LBS | DIASTOLIC BLOOD PRESSURE: 80 MMHG | OXYGEN SATURATION: 98 % | HEART RATE: 56 BPM

## 2017-08-25 DIAGNOSIS — Z80.0 FAMILY HISTORY OF PANCREATIC CANCER: Primary | ICD-10-CM

## 2017-08-25 ASSESSMENT — PAIN SCALES - GENERAL: PAINLEVEL: NO PAIN (0)

## 2017-08-25 NOTE — MR AVS SNAPSHOT
After Visit Summary   8/25/2017    Pradeep Junior    MRN: [id number]           Patient Information     Date Of Birth          1955        Visit Information        Provider Department      8/25/2017 8:45 AM Oliverio Tapia MD [facility]creas [facility]iliary        Today's Diagnoses     Family history of pancreatic cancer    -  1       Follow-ups after your visit        Your next 10 appointments already scheduled     Aug 28, 2017   Procedure with Oliverio Tapia MD   [facility], [place], Same Day Surgery (--)    [address]   [place] [id number] [phone]              Who to contact     Please call your clinic at [phone] to:    Ask questions about your health    Make or cancel appointments    Discuss your medicines    Learn about your test results    Speak to your doctor   If you have compliments or concerns about an experience at your clinic, or if you wish to file a complaint, please contact [facility] Patient Relations at [phone] or email us at [email or web address]         Additional Information About Your Visit        MyChart Information     [employer] gives you secure access to your electronic health record. If you see a primary care provider, you can also send messages to your care team and make appointments. If you have questions, please call your primary care clinic.  If you do not have a primary care provider, please call [phone] and they will assist you.      [employer] is an electronic gateway that provides easy, online access to your medical records. With [employer], you can request a clinic appointment, read your test results, renew a prescription or communicate with your care team.     To access your existing account, please contact your [facility] Clinic or call [phone] for assistance.        Care EveryWhere ID     This is your Care EveryWhere ID. This could be used by other organizations to access  your [place] medical records  [id number]        Your Vitals Were     Pulse Temperature Pulse Oximetry             56 97.5  F (36.4  C) (Oral) 98%          Blood Pressure from Last 3 Encounters:   08/25/17 139/80   05/11/17 143/58    Weight from Last 3 Encounters:   08/25/17 62.8 kg (138 lb 6.4 oz)   05/11/17 64.4 kg (142 lb)              Today, you had the following     No orders found for display       Primary Care Provider Office Phone # Fax #    Dean G Nissen [phone]       [facility] [address] [facility] [address]        Equal Access to Services     ALEX HUMPHREYS : Hadii jake sawant Sokarlie, waaxda luqadaha, qaybta kaalmada yamil, viviana ortiz. So [facility] [phone].    ATENCIÓN: Si habla español, tiene a fisher disposición servicios gratuitos de asistencia lingüística. Llame al [phone].    We comply with applicable federal civil rights laws and [place] laws. We do not discriminate on the basis of race, color, national origin, age, disability sex, sexual orientation or gender identity.            Thank you!     Thank you for choosing [facility]CREAS AND BILI[facility]  for your care. Our goal is always to provide you with excellent care. Hearing back from our patients is one way we can continue to improve our services. Please take a few minutes to complete the written survey that you may receive in the mail after your visit with us. Thank you!             Your Updated Medication List - Protect others around you: Learn how to safely use, store and throw away your medicines at www.disposemymeds.org.          This list is accurate as of: 8/25/17  3:00 PM.  Always use your most recent med list.                   Brand Name Dispense Instructions for use Diagnosis    aspirin 325 MG tablet      Take 325 mg by mouth daily        ATORVASTATIN CALCIUM PO      Take 40 mg by mouth daily

## 2017-08-25 NOTE — PROGRESS NOTES
Therapeutic Endoscopy Clinic Visit    CC/Referring Physician:  Janine Alfredo  Question for Consultation:  Strong family history of pancreatic cancer    Assessment and Plan:  Mr Junior is a 61yo gentleman with an impressive family history of pancreatic cancer which mandates screening; outside CT was reviewed and without any concerning finding and he is organized to have an EUS with deep sedation next Monday. Importantly, he has no abdominal complaints and no personal history of pancreaticobiliary disease. If we find a lesion we will sample and move forward with evaluation and care as dictated by results. If negative, we will recommend noninvasive imaging (MRI) here yearly at first and then perhaps with a progressively longer interval thereafter.    RTC as clinical course dictates    Thank you for this consultation.  It was a pleasure to participate in the care of this patient; please contact us with any further questions.  A total of 40 minutes was spent in face to face evaluation with this patient, >50% of which was counseling regarding the above delineated issues.    Oliverio Tapia MD PhD  Director of Endoscopy   of Medicine, Surgery and Pediatrics  Interventional and Therapeutic Endoscopy    [facility]  Division of Gastroenterology and Hepatology  [place] [address]    New Consultations  [phone]  Procedure Scheduling [phone]  Clinical Nurse Coordinator [phone]  Clinical Fax   [phone]  Administrative   [phone]  Administrative Fax  [phone]    -------------------------------------------------------------------------------------------------------------------  History of Presentation:   Mr Junior is a delightful 62 year old gentleman with a strong family history of pancreatic cancer (2 first degress and 4 third degree; also a first cousin with biliary cancer) without any personal history  of pancreatic disease or abdominal complaints. Outside CT was obtained demonstrating an unremarkable pancreaticobiliary system. Genetic testing is declined by insurance so his sister with diagnosed pancreatic cancer obtained the panel however this was negative and focused genetic testing is not feasible. He has a personal history of prostate cancer managed by radical.    Family History  Pradeep Junior is referred by Janina Cordova, Certified Genetic Counselor, for an oncology risk management consultation. He requires evaluation for his risk of cancer secondary to having a family history of pancreatic cancer in his father, sister and 4 paternal great aunts.      Review of systems:  A twelve point review was performed and was otherwise negative beyond what is mentioned in the history above.    Pertinent past medical history:  Past Medical History:   Diagnosis Date     Colon polyps 10/30/2013     Kidney stones 08/26/2015     Malignant neoplasm of prostate (H) 01/01/2007    adenocarcinoam of the prostate gland     Mixed hyperlipidemia 10/28/2008     Paget's disease of bone 10/12/2010    osteitis deformans without mention of bone tumor       Previous surgeries:  Past Surgical History:   Procedure Laterality Date     C LAPAROSCOPY, SURGICAL PROSTATECTOMY, RETROPUBIC RADICAL, W/NERVE SPARING  2007     COLONOSCOPY  10/20/2013     STRABISMUS SURGERY  1962     Current mediations:  Current Outpatient Prescriptions   Medication     aspirin 325 MG tablet     ATORVASTATIN CALCIUM PO     No current facility-administered medications for this visit.      Medications reviewed with patient today, see Medication List/Assessment for details.  No other NSAID/anticoagulation reported by patient.  No other OTC/herbal/supplements reported by patient.    Social history:  Social History     Social History     Marital status:      Spouse name: Mary     Number of children: 2     Years of education: N/A     Occupational History      Realtor      Social History Main Topics     Smoking status: Current Some Day Smoker     Types: Cigars     Start date: 2007     Smokeless tobacco: Never Used      Comment: 2 cigars only while golfing, states he doesn't need help quitting     Alcohol use 1.8 oz/week     3 Glasses of wine per week     Drug use: No     Sexual activity: Not on file     Other Topics Concern     Not on file     Social History Narrative       Family history:  Family History   Problem Relation Age of Onset     Pancreatic Cancer Father 58      at 59     Pancreatic Cancer Sister 65     nonsmoker, nondrinker, genetic test results pending (2017)     Other - See Comments Sister      Gilbert's Syndrome     Other - See Comments Brother 54     pancreatic cyst     Colon Polyps Brother 25     Brain Cancer Maternal Grandfather 76      at 77     Pancreatic Cancer Other      4 paternal great aunts with pancreatic cancer ranging grom 40s to 60s     DIABETES Daughter      Type 1 diabetes     Other - See Comments Sister      Gilbert's Syndrome     Colon Polyps Sister      Colon Polyps Brother 66     Family history reviewed and updated in EPIC  No colon/panc/other GI CA, no other HNPCC-related Xenia.  No IBD/celiac, no AI/liver disease.    PHYSICAL EXAMINATION:  Vitals reviewed, AFVSS   Wt   Wt Readings from Last 2 Encounters:   17 62.8 kg (138 lb 6.4 oz)   17 64.4 kg (142 lb)      Gen: nontoxic, aaox3, cooperative, pleasant, not dyspneic/diaphoretic  HEENT: neck supple, normal op w/o ulcer/exudate, anicteric  Resp/CV unremarkable without significant finding  Abd: , small scar from mole removal RUQ, benign, soft, nondistended, intact bs, no hepatosplenomegaly, nontender, no peritoneal signs  Ext: no c/c/e  Skin: warm, perfused, no jaundice  Neuro: grossly intact    Pertinent outside studies:      Answers for HPI/ROS submitted by the patient on 2017   General Symptoms: No  Skin Symptoms: Yes  HENT Symptoms: No  EYE SYMPTOMS:  No  HEART SYMPTOMS: No  LUNG SYMPTOMS: No  INTESTINAL SYMPTOMS: No  URINARY SYMPTOMS: No  REPRODUCTIVE SYMPTOMS: No  SKELETAL SYMPTOMS: No  BLOOD SYMPTOMS: No  NERVOUS SYSTEM SYMPTOMS: No  MENTAL HEALTH SYMPTOMS: No  Changes in hair: No  Changes in moles/birth marks: No  Itching: No  Rashes: No  Changes in nails: No  Acne: No  Change in facial hair: No  Warts: No  Non-healing sores: No  Scarring: No  Flaking of skin: No  Color changes of hands/feet in cold : No  Sun sensitivity: No  Skin thickening: No

## 2017-08-25 NOTE — NURSING NOTE
Chief Complaint   Patient presents with     Consult     new pt. SEE CT 7/11/2017       Vitals:    08/25/17 0829   BP: 139/80   Pulse: 56   Temp: 97.5  F (36.4  C)   TempSrc: Oral   SpO2: 98%   Weight: 138 lb 6.4 oz       There is no height or weight on file to calculate BMI.      WOUND EVALUATION:

## 2017-08-25 NOTE — LETTER
8/25/2017       RE: Pradeep Junior  [id number]  [place] [address]     Dear Colleague,    Thank you for referring your patient, Pradeep Junior, to the [facility] at [facility]. Please see a copy of my visit note below.    Therapeutic Endoscopy Clinic Visit    CC/Referring Physician:  Janine Alfredo  Question for Consultation:  Strong family history of pancreatic cancer    Assessment and Plan:  Mr Junior is a 61yo gentleman with an impressive family history of pancreatic cancer which mandates screening; outside CT was reviewed and without any concerning finding and he is organized to have an EUS with deep sedation next Monday. Importantly, he has no abdominal complaints and no personal history of pancreaticobiliary disease. If we find a lesion we will sample and move forward with evaluation and care as dictated by results. If negative, we will recommend noninvasive imaging (MRI) here yearly at first and then perhaps with a progressively longer interval thereafter.    RTC as clinical course dictates    Thank you for this consultation.  It was a pleasure to participate in the care of this patient; please contact us with any further questions.  A total of 40 minutes was spent in face to face evaluation with this patient, >50% of which was counseling regarding the above delineated issues.    Oliverio Tapia MD PhD  Director of Endoscopy   of Medicine, Surgery and Pediatrics  Interventional and Therapeutic Endoscopy    [facility]  Division of Gastroenterology and Hepatology  [facility] [address]  [address], [place] [address]    New Consultations  [phone]  Procedure Scheduling [phone]  Clinical Nurse Coordinator [phone]  Clinical Fax   [phone]  Administrative   [phone]  Administrative  Fax  [phone]    -------------------------------------------------------------------------------------------------------------------  History of Presentation:   Mr Junior is a delightful 62 year old gentleman with a strong family history of pancreatic cancer (2 first degress and 4 third degree; also a first cousin with biliary cancer) without any personal history of pancreatic disease or abdominal complaints. Outside CT was obtained demonstrating an unremarkable pancreaticobiliary system. Genetic testing is declined by insurance so his sister with diagnosed pancreatic cancer obtained the panel however this was negative and focused genetic testing is not feasible. He has a personal history of prostate cancer managed by radical.    Family History  Pradeep Junior is referred by Janina Cordova, Certified Genetic Counselor, for an oncology risk management consultation. He requires evaluation for his risk of cancer secondary to having a family history of pancreatic cancer in his father, sister and 4 paternal great aunts.      Review of systems:  A twelve point review was performed and was otherwise negative beyond what is mentioned in the history above.    Pertinent past medical history:  Past Medical History:   Diagnosis Date     Colon polyps 10/30/2013     Kidney stones 08/26/2015     Malignant neoplasm of prostate (H) 01/01/2007    adenocarcinoam of the prostate gland     Mixed hyperlipidemia 10/28/2008     Paget's disease of bone 10/12/2010    osteitis deformans without mention of bone tumor       Previous surgeries:  Past Surgical History:   Procedure Laterality Date     C LAPAROSCOPY, SURGICAL PROSTATECTOMY, RETROPUBIC RADICAL, W/NERVE SPARING  2007     COLONOSCOPY  10/20/2013     STRABISMUS SURGERY  1962     Current mediations:  Current Outpatient Prescriptions   Medication     aspirin 325 MG tablet     ATORVASTATIN CALCIUM PO     No current facility-administered medications for this visit.      Medications  reviewed with patient today, see Medication List/Assessment for details.  No other NSAID/anticoagulation reported by patient.  No other OTC/herbal/supplements reported by patient.    Social history:  Social History     Social History     Marital status:      Spouse name: Mary     Number of children: 2     Years of education: N/A     Occupational History     Realtor      Social History Main Topics     Smoking status: Current Some Day Smoker     Types: Cigars     Start date: 2007     Smokeless tobacco: Never Used      Comment: 2 cigars only while golfing, states he doesn't need help quitting     Alcohol use 1.8 oz/week     3 Glasses of wine per week     Drug use: No     Sexual activity: Not on file     Other Topics Concern     Not on file     Social History Narrative       Family history:  Family History   Problem Relation Age of Onset     Pancreatic Cancer Father 58      at 59     Pancreatic Cancer Sister 65     nonsmoker, nondrinker, genetic test results pending (2017)     Other - See Comments Sister      Gilbert's Syndrome     Other - See Comments Brother 54     pancreatic cyst     Colon Polyps Brother 25     Brain Cancer Maternal Grandfather 76      at 77     Pancreatic Cancer Other      4 paternal great aunts with pancreatic cancer ranging grom 40s to 60s     DIABETES Daughter      Type 1 diabetes     Other - See Comments Sister      Gilbert's Syndrome     Colon Polyps Sister      Colon Polyps Brother 66     Family history reviewed and updated in EPIC  No colon/panc/other GI CA, no other HNPCC-related Xenia.  No IBD/celiac, no AI/liver disease.    PHYSICAL EXAMINATION:  Vitals reviewed, AFVSS   Wt   Wt Readings from Last 2 Encounters:   17 62.8 kg (138 lb 6.4 oz)   17 64.4 kg (142 lb)      Gen: nontoxic, aaox3, cooperative, pleasant, not dyspneic/diaphoretic  HEENT: neck supple, normal op w/o ulcer/exudate, anicteric  Resp/CV unremarkable without significant finding  Abd: ,  small scar from mole removal RUQ, benign, soft, nondistended, intact bs, no hepatosplenomegaly, nontender, no peritoneal signs  Ext: no c/c/e  Skin: warm, perfused, no jaundice  Neuro: grossly intact    Pertinent outside studies:      Answers for HPI/ROS submitted by the patient on 8/20/2017   General Symptoms: No  Skin Symptoms: Yes  HENT Symptoms: No  EYE SYMPTOMS: No  HEART SYMPTOMS: No  LUNG SYMPTOMS: No  INTESTINAL SYMPTOMS: No  URINARY SYMPTOMS: No  REPRODUCTIVE SYMPTOMS: No  SKELETAL SYMPTOMS: No  BLOOD SYMPTOMS: No  NERVOUS SYSTEM SYMPTOMS: No  MENTAL HEALTH SYMPTOMS: No  Changes in hair: No  Changes in moles/birth marks: No  Itching: No  Rashes: No  Changes in nails: No  Acne: No  Change in facial hair: No  Warts: No  Non-healing sores: No  Scarring: No  Flaking of skin: No  Color changes of hands/feet in cold : No  Sun sensitivity: No  Skin thickening: No      Again, thank you for allowing me to participate in the care of your patient.      Sincerely,    Oliverio Tapia MD

## 2017-08-27 ENCOUNTER — ANESTHESIA EVENT (OUTPATIENT)
Dept: SURGERY | Facility: CLINIC | Age: 62
End: 2017-08-27
Payer: COMMERCIAL

## 2017-08-27 NOTE — ANESTHESIA PREPROCEDURE EVALUATION
Anesthesia Evaluation     . Pt has had prior anesthetic. Type: General    No history of anesthetic complications          ROS/MED HX    ENT/Pulmonary:  - neg pulmonary ROS     Neurologic:  - neg neurologic ROS     Cardiovascular:     (+) Dyslipidemia, ----. : . . . :. . date:results:date: results:ECG reviewed date: results:8-22-17 SB with minimal LVH per physician H&P (no tracing available to me) date: results:          METS/Exercise Tolerance:     Hematologic:  - neg hematologic  ROS       Musculoskeletal:   (+) , , other musculoskeletal- Paget's dz of bone      GI/Hepatic:     (+) Other GI/Hepatic fm hx of pancreatic ca; colonic polyps      Renal/Genitourinary:     (+) chronic renal disease, Nephrolithiasis , Other Renal/ Genitourinary, h/o prostate ca      Endo:  - neg endo ROS       Psychiatric:  - neg psychiatric ROS       Infectious Disease:  - neg infectious disease ROS       Malignancy:   (+) Malignancy History of Prostate          Other:                                    Anesthesia Plan      History & Physical Review  History and physical reviewed and following examination; no interval change.    ASA Status:  2 .    NPO Status:  > 6 hours    Plan for MAC with Intravenous and Propofol induction. Maintenance will be Balanced.    PONV prophylaxis:  Ondansetron (or other 5HT-3)       Postoperative Care  Postoperative pain management:  IV analgesics.      Consents  Anesthetic plan, risks, benefits and alternatives discussed with:  Patient..                          .

## 2017-08-28 ENCOUNTER — ANESTHESIA (OUTPATIENT)
Dept: SURGERY | Facility: CLINIC | Age: 62
End: 2017-08-28
Payer: COMMERCIAL

## 2017-08-28 ENCOUNTER — HOSPITAL ENCOUNTER (OUTPATIENT)
Facility: CLINIC | Age: 62
Discharge: HOME | End: 2017-08-28
Attending: INTERNAL MEDICINE | Admitting: INTERNAL MEDICINE
Payer: COMMERCIAL

## 2017-08-28 VITALS
SYSTOLIC BLOOD PRESSURE: 137 MMHG | TEMPERATURE: 97.8 F | BODY MASS INDEX: 21.7 KG/M2 | RESPIRATION RATE: 18 BRPM | HEIGHT: 67 IN | OXYGEN SATURATION: 100 % | WEIGHT: 138.23 LBS | DIASTOLIC BLOOD PRESSURE: 98 MMHG

## 2017-08-28 LAB
BUN SERPL-MCNC: 13 MG/DL (ref 7–30)
CREAT SERPL-MCNC: 0.97 MG/DL (ref 0.66–1.25)
ERYTHROCYTE [DISTWIDTH] IN BLOOD BY AUTOMATED COUNT: 12.9 % (ref 10–15)
GFR SERPL CREATININE-BSD FRML MDRD: 78 ML/MIN/1.7M2
HCT VFR BLD AUTO: 42.1 % (ref 40–53)
HGB BLD-MCNC: 14.7 G/DL (ref 13.3–17.7)
INR PPP: 0.99 (ref 0.86–1.14)
MCH RBC QN AUTO: 29.7 PG (ref 26.5–33)
MCHC RBC AUTO-ENTMCNC: 34.9 G/DL (ref 31.5–36.5)
MCV RBC AUTO: 85 FL (ref 78–100)
PLATELET # BLD AUTO: 199 10E9/L (ref 150–450)
POTASSIUM SERPL-SCNC: 4.1 MMOL/L (ref 3.4–5.3)
RBC # BLD AUTO: 4.95 10E12/L (ref 4.4–5.9)
UPPER EUS: NORMAL
WBC # BLD AUTO: 3.6 10E9/L (ref 4–11)

## 2017-08-28 PROCEDURE — 25000125 ZZHC RX 250: Performed by: NURSE ANESTHETIST, CERTIFIED REGISTERED

## 2017-08-28 PROCEDURE — 37000008 ZZH ANESTHESIA TECHNICAL FEE, 1ST 30 MIN: Performed by: INTERNAL MEDICINE

## 2017-08-28 PROCEDURE — 36000053 ZZH SURGERY LEVEL 2 EA 15 ADDTL MIN - UMMC: Performed by: INTERNAL MEDICINE

## 2017-08-28 PROCEDURE — 25000128 H RX IP 250 OP 636: Performed by: NURSE ANESTHETIST, CERTIFIED REGISTERED

## 2017-08-28 PROCEDURE — 40000170 ZZH STATISTIC PRE-PROCEDURE ASSESSMENT II: Performed by: INTERNAL MEDICINE

## 2017-08-28 PROCEDURE — 27210794 ZZH OR GENERAL SUPPLY STERILE: Performed by: INTERNAL MEDICINE

## 2017-08-28 PROCEDURE — 84520 ASSAY OF UREA NITROGEN: CPT | Performed by: INTERNAL MEDICINE

## 2017-08-28 PROCEDURE — 25000125 ZZHC RX 250: Performed by: INTERNAL MEDICINE

## 2017-08-28 PROCEDURE — 84132 ASSAY OF SERUM POTASSIUM: CPT | Performed by: INTERNAL MEDICINE

## 2017-08-28 PROCEDURE — 36415 COLL VENOUS BLD VENIPUNCTURE: CPT | Performed by: INTERNAL MEDICINE

## 2017-08-28 PROCEDURE — 85027 COMPLETE CBC AUTOMATED: CPT | Performed by: INTERNAL MEDICINE

## 2017-08-28 PROCEDURE — 82565 ASSAY OF CREATININE: CPT | Performed by: INTERNAL MEDICINE

## 2017-08-28 PROCEDURE — 25000128 H RX IP 250 OP 636: Performed by: ANESTHESIOLOGY

## 2017-08-28 PROCEDURE — 85610 PROTHROMBIN TIME: CPT | Performed by: INTERNAL MEDICINE

## 2017-08-28 PROCEDURE — 36000051 ZZH SURGERY LEVEL 2 1ST 30 MIN - UMMC: Performed by: INTERNAL MEDICINE

## 2017-08-28 PROCEDURE — 37000009 ZZH ANESTHESIA TECHNICAL FEE, EACH ADDTL 15 MIN: Performed by: INTERNAL MEDICINE

## 2017-08-28 PROCEDURE — 71000027 ZZH RECOVERY PHASE 2 EACH 15 MINS: Performed by: INTERNAL MEDICINE

## 2017-08-28 RX ORDER — LIDOCAINE 40 MG/G
CREAM TOPICAL
Status: DISCONTINUED | OUTPATIENT
Start: 2017-08-28 | End: 2017-08-28 | Stop reason: HOSPADM

## 2017-08-28 RX ORDER — NALOXONE HYDROCHLORIDE 0.4 MG/ML
.1-.4 INJECTION, SOLUTION INTRAMUSCULAR; INTRAVENOUS; SUBCUTANEOUS
Status: DISCONTINUED | OUTPATIENT
Start: 2017-08-28 | End: 2017-08-28 | Stop reason: HOSPADM

## 2017-08-28 RX ORDER — FENTANYL CITRATE 50 UG/ML
INJECTION, SOLUTION INTRAMUSCULAR; INTRAVENOUS PRN
Status: DISCONTINUED | OUTPATIENT
Start: 2017-08-28 | End: 2017-08-28

## 2017-08-28 RX ORDER — FLUMAZENIL 0.1 MG/ML
0.2 INJECTION, SOLUTION INTRAVENOUS
Status: DISCONTINUED | OUTPATIENT
Start: 2017-08-28 | End: 2017-08-28 | Stop reason: HOSPADM

## 2017-08-28 RX ORDER — PROPOFOL 10 MG/ML
INJECTION, EMULSION INTRAVENOUS PRN
Status: DISCONTINUED | OUTPATIENT
Start: 2017-08-28 | End: 2017-08-28

## 2017-08-28 RX ORDER — SODIUM CHLORIDE, SODIUM LACTATE, POTASSIUM CHLORIDE, CALCIUM CHLORIDE 600; 310; 30; 20 MG/100ML; MG/100ML; MG/100ML; MG/100ML
INJECTION, SOLUTION INTRAVENOUS CONTINUOUS
Status: DISCONTINUED | OUTPATIENT
Start: 2017-08-28 | End: 2017-08-28 | Stop reason: HOSPADM

## 2017-08-28 RX ORDER — PROPOFOL 10 MG/ML
INJECTION, EMULSION INTRAVENOUS CONTINUOUS PRN
Status: DISCONTINUED | OUTPATIENT
Start: 2017-08-28 | End: 2017-08-28

## 2017-08-28 RX ORDER — LIDOCAINE HYDROCHLORIDE 20 MG/ML
INJECTION, SOLUTION INFILTRATION; PERINEURAL PRN
Status: DISCONTINUED | OUTPATIENT
Start: 2017-08-28 | End: 2017-08-28

## 2017-08-28 RX ORDER — GLYCOPYRROLATE 0.2 MG/ML
INJECTION, SOLUTION INTRAMUSCULAR; INTRAVENOUS PRN
Status: DISCONTINUED | OUTPATIENT
Start: 2017-08-28 | End: 2017-08-28

## 2017-08-28 RX ADMIN — FENTANYL CITRATE 25 MCG: 50 INJECTION, SOLUTION INTRAMUSCULAR; INTRAVENOUS at 11:53

## 2017-08-28 RX ADMIN — PROPOFOL 100 MCG/KG/MIN: 10 INJECTION, EMULSION INTRAVENOUS at 11:53

## 2017-08-28 RX ADMIN — FENTANYL CITRATE 25 MCG: 50 INJECTION, SOLUTION INTRAMUSCULAR; INTRAVENOUS at 12:00

## 2017-08-28 RX ADMIN — SODIUM CHLORIDE, POTASSIUM CHLORIDE, SODIUM LACTATE AND CALCIUM CHLORIDE: 600; 310; 30; 20 INJECTION, SOLUTION INTRAVENOUS at 11:35

## 2017-08-28 RX ADMIN — Medication 0.1 MG: at 11:47

## 2017-08-28 RX ADMIN — LIDOCAINE HYDROCHLORIDE 40 MG: 20 INJECTION, SOLUTION INFILTRATION; PERINEURAL at 11:53

## 2017-08-28 RX ADMIN — BENZOCAINE 1 EACH: 220 SPRAY, METERED PERIODONTAL at 11:53

## 2017-08-28 RX ADMIN — PROPOFOL 20 MG: 10 INJECTION, EMULSION INTRAVENOUS at 12:02

## 2017-08-28 RX ADMIN — MIDAZOLAM HYDROCHLORIDE 1 MG: 1 INJECTION, SOLUTION INTRAMUSCULAR; INTRAVENOUS at 11:47

## 2017-08-28 RX ADMIN — MIDAZOLAM HYDROCHLORIDE 1 MG: 1 INJECTION, SOLUTION INTRAMUSCULAR; INTRAVENOUS at 11:53

## 2017-08-28 RX ADMIN — Medication 0.1 MG: at 12:06

## 2017-08-28 NOTE — IP AVS SNAPSHOT
MRN:[id number]                      After Visit Summary   8/28/2017    Pradeep Junior    MRN: [id number]           Thank you!     Thank you for choosing [facility] for your care. Our goal is always to provide you with excellent care. Hearing back from our patients is one way we can continue to improve our services. Please take a few minutes to complete the written survey that you may receive in the mail after you visit with us. Thank you!        Patient Information     Date Of Birth          1955        About your hospital stay     You were admitted on:  August 28, 2017 You last received care in the:  [facility]    You were discharged on:  August 28, 2017       Who to Call     For medical emergencies, please call 911.  For non-urgent questions about your medical care, please call your primary care provider or clinic, [phone]  For questions related to your surgery, please call your surgery clinic        Attending Provider     Provider Oliverio Smith MD Gastroenterology       Primary Care Provider Office Phone # Fax #    Dean G Nissen [phone]      After Care Instructions     Discharge Instructions       No driving or operating machinery until the day after procedure.            Discharge Instructions       Recommend that a responsible adult remain with the patient at home for 24 hours post discharge.            Discharge Instructions       Start with clear liquids, sips of water 1 hour after procedure. If no abdominal pain and gag reflex has returned, advance as tolerated to pre-procedure diet.              Discharge Instructions       Restart home medications.            Discharge Instructions       Check with your Provider when to start anticoagulant medication.            Discharge Instructions       No ALCOHOL 24 hours post procedure.                  Further instructions from your care team       [facility]  [facility]  Same-Day Surgery   Adult Discharge Orders & Instructions     For 24 hours after surgery    1. Get plenty of rest.  A responsible adult must stay with you for at least 24 hours after you leave the hospital.   2. Do not drive or use heavy equipment.  If you have weakness or tingling, don't drive or use heavy equipment until this feeling goes away.  3. Do not drink alcohol.  4. Avoid strenuous or risky activities.  Ask for help when climbing stairs.   5. You may feel lightheaded.  IF so, sit for a few minutes before standing.  Have someone help you get up.   6. If you have nausea (feel sick to your stomach): Drink only clear liquids such as apple juice, ginger ale, broth or 7-Up.  Rest may also help.  Be sure to drink enough fluids.  Move to a regular diet as you feel able.  7. You may have a slight fever. Call the doctor if your fever is over 100 F (37.7 C) (taken under the tongue) or lasts longer than 24 hours.  8. You may have a dry mouth, a sore throat, muscle aches or trouble sleeping.  These should go away after 24 hours.  9. Do not make important or legal decisions.   Call your doctor for any of the followin.  Signs of infection (fever, growing tenderness at the surgery site, a large amount of drainage or bleeding, severe pain, foul-smelling drainage, redness, swelling).    2. It has been over 8 to 10 hours since surgery and you are still not able to urinate (pass water).    3.  Headache for over 24 hours.      To contact a doctor, call Dr. Tapia's office @ [phone] (M-F office hours)  or:  X   [phone] and ask for the resident on call for Gastroenterology (answered 24 hours a day)  X   Emergency Department:   [facility]: [phone]       (TTY for hearing impaired: [phone])                Pending Results     No orders found from 2017 to 2017.            Admission Information     Date & Time Provider Department Dept. Phone    2017 Willie  "Oliverio Montana MD Same Day Surgery [facility] [phone]      Your Vitals Were     Blood Pressure Temperature Respirations Height Weight Pulse Oximetry    119/85 97.6  F (36.4  C) (Oral) 17 1.7 m (5' 6.93\") 62.7 kg (138 lb 3.7 oz) 98%    BMI (Body Mass Index)                   21.7 kg/m2           School Admissions Information     School Admissions gives you secure access to your electronic health record. If you see a primary care provider, you can also send messages to your care team and make appointments. If you have questions, please call your primary care clinic.  If you do not have a primary care provider, please call [phone] and they will assist you.        Care EveryWhere ID     This is your Care EveryWhere ID. This could be used by other organizations to access your [facility] medical records  [id number]        Equal Access to Services     ALEX HUMPHREYS : Sergio Phipps, yue canada, federica lobo, viviana arenas . So [facility] [phone].    ATENCIÓN: Si habla español, tiene a fisher disposición servicios gratuitos de asistencia lingüística. Llame al [phone].    We comply with applicable federal civil rights laws and [place] laws. We do not discriminate on the basis of race, color, national origin, age, disability sex, sexual orientation or gender identity.               Review of your medicines      UNREVIEWED medicines. Ask your doctor about these medicines        Dose / Directions    aspirin 325 MG tablet        Dose:  325 mg   Take 325 mg by mouth daily   Refills:  0       ATORVASTATIN CALCIUM PO   Indication:  not a calcium, only cholesterol drug.        Dose:  40 mg   Take 40 mg by mouth every evening   Refills:  0                Protect others around you: Learn how to safely use, store and throw away your medicines at www.disposemymeds.org.             Medication List: This is a list of all your medications and when to take them. Check marks below " indicate your daily home schedule. Keep this list as a reference.      Medications           Morning Afternoon Evening Bedtime As Needed    aspirin 325 MG tablet   Take 325 mg by mouth daily                                ATORVASTATIN CALCIUM PO   Take 40 mg by mouth every evening                                          More Information        Endoscopic Ultrasound (EUS)    An endoscopic ultrasound (EUS) is a test to look at the inside of your gastrointestinal (GI) tract. It's commonly used to look for cancers or growths in the esophagus, stomach, pancreas, liver, and rectum. It can help to stage cancer (see how advanced a cancer is). EUS may also be used to help diagnose certain diseases or to drain cysts or abscesses.  What is EUS?  EUS shows both ultrasound images and live video of the GI tract. During the test, a flexible tube called an endoscope (scope) is used. At the end of the scope is a tiny video camera and light. The video camera sends live images to a monitor. The scope also contains a very small ultrasound device. This uses sound waves to create images and send them to a monitor.  A needle is passed through the scope. The needle can be used take a small sample of tissue for testing. This is called a biopsy. The needle can be used to take a sample of fluid. This is called fine-needle aspiration (FNA).  Risks and possible complications of EUS  Risks and possible complications include the following:    Bleeding    Infection    A perforation (hole) in the digestive tract     Risks of sedation or anesthesia   Before the test  Be prepared prior to the test:    Tell your healthcare provider what medicine you take. This includes vitamins, herbs, and over-the-counter medicine. It also includes any blood thinners, such as warfarin, clopidogrel, ibuprofen, or daily aspirin. Ask your healthcare provider if you need to stop taking some or all of them before the test.    You may be prescribed antibiotics to take  before or after the test. This depends on the area being studied and what is done during the test. These medicines help prevent infection.    Carefully follow the instructions for preparing for the test to make sure results are accurate. Instructions may include:    If you re having an EUS of the upper GI tract (esophagus, stomach, duodenum, pancreas, liver):    Do not eat or drink for 6 hours before the test.    If you re having an EUS of the lower GI tract (rectum):    Before the test, do bowel prep as instructed to clean your rectum of stool. This may involve a clear liquid diet and using a laxative (liquid or pills) the night before the test. Or it may mean doing one or more enemas the morning of the test.    Do not eat or drink for 6 hours before the test.    Be sure to arrive on time at the facility. Bring your identification and health insurance card. Leave valuables at home. If you have them, bring X-rays or other test results with you.  Let the healthcare provider know  For your safety, tell the healthcare provider if you:    Take insulin. Your dose may need to be changed on the day of your test.    Are allergic to latex.    Have any other allergies.    Are taking blood thinners.   During the test  An endoscopic ultrasound usually takes place in a hospital. The procedure itself may take 1 to 2 hours. You will likely go home soon afterward. During the test:    You lie on your left side on an exam table.    An intravenous (IV) line will be put into a vein in your arm or hand. This line supplies fluids and medicines. To keep you comfortable during the test, you will be given a sedative medicine. This medicine prevents discomfort and will make you sleepy.    If you are having an EUS of the upper GI tract, local anesthetic may be sprayed in your throat. This will help you be more comfortable as the healthcare provider inserts the scope. The healthcare provider then gently puts the flexible scope into your mouth  or nose and down your throat.    If you re having an EUS of the lower GI tract, the healthcare provider gently puts the flexible scope into your anus.    During the test, the scope sends live video and ultrasound images from inside your body to nearby monitors. These are used to examine your GI tract. Specialized procedures, such as drainage, are done as needed.    The healthcare provider may discuss the results with you soon after the test. Biopsy results take several  days.    In most cases, you can go home within a few hours of the test. When you leave the facility, have an adult family member or friend drive you, even if you don't feel that sleepy.  After the test  Here is what to expect after the test:    You may feel tired from the sedative. This should wear off by the end of the day.    If you had an upper digestive endoscopy, your throat may feel sore for a day or two. Over-the-counter sore throat lozenges and spray should help.    You can eat and drink normally as soon as the test is done.  When to call the healthcare provider  Call your healthcare provider if you notice any of the following:    Fever of 100.4 F (38.0 C) or higher, or as advised by your healthcare provider    Shortness of breath    Vomiting blood, blood in stool, or black stools    Coughing or hoarse voice that won t go away   Date Last Reviewed: 7/1/2016 2000-2017 [employer]. [address] [place] [address], [place] [address]. All rights reserved. This information is not intended as a substitute for professional medical care. Always follow your healthcare professional's instructions.

## 2017-08-28 NOTE — DISCHARGE INSTRUCTIONS
[facility]  Same-Day Surgery   Adult Discharge Orders & Instructions     For 24 hours after surgery    1. Get plenty of rest.  A responsible adult must stay with you for at least 24 hours after you leave the hospital.   2. Do not drive or use heavy equipment.  If you have weakness or tingling, don't drive or use heavy equipment until this feeling goes away.  3. Do not drink alcohol.  4. Avoid strenuous or risky activities.  Ask for help when climbing stairs.   5. You may feel lightheaded.  IF so, sit for a few minutes before standing.  Have someone help you get up.   6. If you have nausea (feel sick to your stomach): Drink only clear liquids such as apple juice, ginger ale, broth or 7-Up.  Rest may also help.  Be sure to drink enough fluids.  Move to a regular diet as you feel able.  7. You may have a slight fever. Call the doctor if your fever is over 100 F (37.7 C) (taken under the tongue) or lasts longer than 24 hours.  8. You may have a dry mouth, a sore throat, muscle aches or trouble sleeping.  These should go away after 24 hours.  9. Do not make important or legal decisions.   Call your doctor for any of the followin.  Signs of infection (fever, growing tenderness at the surgery site, a large amount of drainage or bleeding, severe pain, foul-smelling drainage, redness, swelling).    2. It has been over 8 to 10 hours since surgery and you are still not able to urinate (pass water).    3.  Headache for over 24 hours.      To contact a doctor, call Dr. Tapia's office @ [phone] (M-F office hours)  or:  X   [phone] and ask for the resident on call for Gastroenterology (answered 24 hours a day)  X   Emergency Department:   [facility]: [phone]       (TTY for hearing impaired: [phone])

## 2017-08-28 NOTE — ANESTHESIA POSTPROCEDURE EVALUATION
Patient: Pradeep Junior    Procedure(s):  Upper Endoscopic Ultrasound - Wound Class: II-Clean Contaminated    Diagnosis:Elevaluate for Pancreas Cancer   Diagnosis Additional Information: No value filed.    Anesthesia Type:  MAC    Note:  Anesthesia Post Evaluation    Patient location during evaluation: Phase 2  Patient participation: Able to fully participate in evaluation  Level of consciousness: awake and alert  Pain management: adequate  Airway patency: patent  Cardiovascular status: acceptable  Respiratory status: acceptable  Hydration status: acceptable  PONV: none     Anesthetic complications: None          Last vitals:  Vitals:    08/28/17 1015 08/28/17 1224   BP: 134/86 127/90   Resp: 16    Temp: 36.6  C (97.9  F)    SpO2: 100% 100%         Electronically Signed By: Duane F. Szczepanski, MD  August 28, 2017  12:30 PM

## 2017-08-28 NOTE — IP AVS SNAPSHOT
Same Day Surgery [address] [address]    Phone:  [phone]                                       After Visit Summary   8/28/2017    Pradeep Junior    MRN: [id number]           After Visit Summary Signature Page     I have received my discharge instructions, and my questions have been answered. I have discussed any challenges I see with this plan with the nurse or doctor.    ..........................................................................................................................................  Patient/Patient Representative Signature      ..........................................................................................................................................  Patient Representative Print Name and Relationship to Patient    ..................................................               ................................................  Date                                            Time    ..........................................................................................................................................  Reviewed by Signature/Title    ...................................................              ..............................................  Date                                                            Time

## 2017-08-28 NOTE — BRIEF OP NOTE
Upper EUS 08/28/2017 11:47 AM [facility], [address] [place] [address] [phone]     Endoscopy Department   _______________________________________________________________________________   Patient Name: Pradeep Junior            Procedure Date: 8/28/2017 11:47 AM   MRN: [id number]                       Account Number: [id number]   YOB: 1955              Admit Type: Inpatient   Age: 62                               Room: OR   Gender: Male                          Note Status: Finalized   Attending MD: Oliverio Tapia MD  Pause for the Cause: pause for cause    completed   Total Sedation Time:                     _______________________________________________________________________________       Procedure:           Upper EUS   Indications:         Epigastric abdominal pain, Family history of pancreatic                        cancer   Providers:           Oliverio Tapia MD, Larry Morales MD, Jackie Hinton RN   Patient Profile:     Mr Junior is a 61yo gentleman with a strong family                        history of pancreatic cancer who presents for screening                        EUS.   Referring MD:        Janine Alfredo CNP, NP   Medicines:           Deep sedation was administered   Complications:       No immediate complications.   _______________________________________________________________________________   Procedure:           Pre-Anesthesia Assessment:                        - Prior to the procedure, a History and Physical was                        performed, and patient medications and allergies were                        reviewed. The patient is competent. The risks and                        benefits of the procedure and the sedation options and                        risks were discussed with the patient. All questions                        were answered and informed consent was obtained. Patient                         identification and proposed procedure were verified by                        the nurse in the pre-procedure area. Mental Status                        Examination: alert and oriented. Airway Examination:                        normal oropharyngeal airway and neck mobility.                        Respiratory Examination: clear to auscultation. CV                        Examination: normal. ASA Grade Assessment: I - A normal,                        healthy patient. After reviewing the risks and benefits,                        the patient was deemed in satisfactory condition to                        undergo the procedure. The anesthesia plan was to use                        deep sedation / analgesia. Immediately prior to                        administration of medications, the patient was                        re-assessed for adequacy to receive sedatives. The heart                        rate, respiratory rate, oxygen saturations, blood                        pressure, adequacy of pulmonary ventilation, and                        response to care were monitored throughout the                        procedure. The physical status of the patient was                        re-assessed after the procedure. After obtaining                        informed consent, the endoscope was passed under direct                        vision. Throughout the procedure, the patient's blood                        pressure, pulse, and oxygen saturations were monitored                        continuously. The duodenoscope was introduced through                        the mouth, and advanced to the second part of duodenum.                        The upper EUS was accomplished without difficulty. The                        patient tolerated the procedure well.                                                                                     Findings:        White light and endosonographic findings :        A  curved linear echoendoscope was utilized throughout. Limited oblique        white light views of the foregut were unremarkable. In terms of        endosonography the entire pancreas was evaluated. The parenchyma was        mildly diffusely hypoechoic with multifocal hyperechoic strands and foci        without lobularity. There was no evidence of solid or cystic pancreatic        lesion. The main duct was traced from tail to head and found to be        traditional in course without dilation (measuring 1.5mm in the head,        1.8mm in the neck, and 1.5mm in the body with smooth taper upstream.        There was no evidence of dilate side branch. The common duct was traced        from the periportal region to the ampulla and found to be decompressed        throughout, measuring <3mm in the region of the head, and without wall        thickening or internal solid component. The gallbladder is in situ, also        without wall thickening or internal solid component. There was no        evidence of periportal, peripancreatic, perigastric or celiac region        lymphadenopathy. The major vasculature of the abdomen including the        celiac, portal, splenics, gastroduodenal, and superior mesenterics were        traditional and unremarkable. Limited views of the left lobe, caudate        lobe, right lobe, left kidney, left adrenal and spleen were unremarkable        without lesion.                                                                                     Impression:          - Grossly unremarkable, normal endoscopic ultrasound of                        the abdomen without suspicious pancreatic lesion or                        lymphadenopathy demonstrated                        - Normal appearing gallbladder and pancreaticobiliay                        ductal system   Recommendation:      - Standard outpatient deep sedation recovery with                        probable discharge home this afternoon                         - Interval noninvasive screening in one year (MRI of the                        abdomen)                        - Follow up with your established providers as scheduled                        - The findings and recommendations were discussed with                        the patient and their family                                                                                       electronically signed by VALE Tapia

## 2017-08-28 NOTE — ANESTHESIA CARE TRANSFER NOTE
Patient: Pradeep Junior    Procedure(s):  Upper Endoscopic Ultrasound - Wound Class: II-Clean Contaminated    Diagnosis: Elevaluate for Pancreas Cancer   Diagnosis Additional Information: No value filed.    Anesthesia Type:   MAC     Note:  Airway :Room Air  Patient transferred to:Phase II  Comments: VSS on RA, report to RN.       Vitals: (Last set prior to Anesthesia Care Transfer)    CRNA VITALS  8/28/2017 1150 - 8/28/2017 1227      8/28/2017             NIBP: 127/90    Pulse: 65                Electronically Signed By: DAMIEN Shipley CRNA  August 28, 2017  12:27 PM

## 2017-08-30 ENCOUNTER — CARE COORDINATION (OUTPATIENT)
Dept: GASTROENTEROLOGY | Facility: CLINIC | Age: 62
End: 2017-08-30

## 2017-08-30 NOTE — PROGRESS NOTES
Post EUS (8/28/2017) with Dr. Tapia: Follow-up    Post procedure recommendations: Interval noninvasive screening in one year (MRI of the abdomen) - Follow up with your established providers as scheduled - The findings and recommendations were discussed with the patient and their family     Inpatient Panc/Bili team to follow: Will follow outpatient as appropriate.    Orders placed: None at this time.     Nereida BOYD RN Coordinator  Dr. Wang, Dr. Tapia & Dr. Feng  [facility]  [phone]

## 2018-07-30 ENCOUNTER — CARE COORDINATION (OUTPATIENT)
Dept: GASTROENTEROLOGY | Facility: CLINIC | Age: 63
End: 2018-07-30

## 2018-07-30 DIAGNOSIS — Z80.0 FAMILY HISTORY OF PANCREATIC CANCER: Primary | ICD-10-CM

## 2018-07-30 NOTE — PROGRESS NOTES
Time for 1 year follow-up with MRI per Dr. Tapia's notes.     Order placed for MRI an message left with Pradeep with radiology number so he can schedule MRI at his convenience.     Clinic number left for him to call with any questions/cocerns.    Nereida BOYD RN Coordinator  Dr. Wang, Dr. Tapia & Dr. Feng   [facility]  [phone]

## 2018-08-09 ENCOUNTER — HOSPITAL ENCOUNTER (OUTPATIENT)
Dept: MRI IMAGING | Facility: CLINIC | Age: 63
Discharge: HOME | End: 2018-08-09
Attending: INTERNAL MEDICINE | Admitting: INTERNAL MEDICINE
Payer: COMMERCIAL

## 2018-08-09 DIAGNOSIS — Z80.0 FAMILY HISTORY OF PANCREATIC CANCER: ICD-10-CM

## 2018-08-09 PROCEDURE — 74183 MRI ABD W/O CNTR FLWD CNTR: CPT

## 2018-08-09 PROCEDURE — 25000128 H RX IP 250 OP 636: Performed by: STUDENT IN AN ORGANIZED HEALTH CARE EDUCATION/TRAINING PROGRAM

## 2018-08-09 PROCEDURE — A9585 GADOBUTROL INJECTION: HCPCS | Performed by: STUDENT IN AN ORGANIZED HEALTH CARE EDUCATION/TRAINING PROGRAM

## 2018-08-09 RX ORDER — GADOBUTROL 604.72 MG/ML
0.1 INJECTION INTRAVENOUS ONCE
Status: COMPLETED | OUTPATIENT
Start: 2018-08-09 | End: 2018-08-09

## 2018-08-09 RX ADMIN — GADOBUTROL 7.5 ML: 604.72 INJECTION INTRAVENOUS at 12:12

## 2019-07-17 ENCOUNTER — CARE COORDINATION (OUTPATIENT)
Dept: GASTROENTEROLOGY | Facility: CLINIC | Age: 64
End: 2019-07-17

## 2019-07-17 DIAGNOSIS — Z80.0 FAMILY HISTORY OF PANCREATIC CANCER: Primary | ICD-10-CM

## 2019-07-17 NOTE — PROGRESS NOTES
1 year follow up per Dr. Tapia's Letter: As discussed previously, our plan will need to involve continued surveillance, next with another MRI in one year.     Order placed.     Spoke with patient and he requested that I send a MyChart with radiology number. He will call my direct line back with any future questions/concerns.     RINA Champagne Dr., Dr. Tapia, & Dr. Feng  [facility]  [phone]

## 2020-03-10 ENCOUNTER — HEALTH MAINTENANCE LETTER (OUTPATIENT)
Age: 65
End: 2020-03-10

## 2020-12-27 ENCOUNTER — HEALTH MAINTENANCE LETTER (OUTPATIENT)
Age: 65
End: 2020-12-27

## 2021-04-24 ENCOUNTER — HEALTH MAINTENANCE LETTER (OUTPATIENT)
Age: 66
End: 2021-04-24

## 2021-06-10 DIAGNOSIS — Z80.0 FAMILY HISTORY OF PANCREATIC CANCER: Primary | ICD-10-CM

## 2021-06-19 ENCOUNTER — HEALTH MAINTENANCE LETTER (OUTPATIENT)
Age: 66
End: 2021-06-19

## 2021-07-15 ENCOUNTER — HOSPITAL ENCOUNTER (OUTPATIENT)
Dept: MRI IMAGING | Facility: CLINIC | Age: 66
Discharge: HOME | End: 2021-07-15
Attending: INTERNAL MEDICINE | Admitting: INTERNAL MEDICINE
Payer: MEDICARE

## 2021-07-15 DIAGNOSIS — Z80.0 FAMILY HISTORY OF PANCREATIC CANCER: ICD-10-CM

## 2021-07-15 PROCEDURE — A9585 GADOBUTROL INJECTION: HCPCS | Performed by: INTERNAL MEDICINE

## 2021-07-15 PROCEDURE — 74183 MRI ABD W/O CNTR FLWD CNTR: CPT

## 2021-07-15 PROCEDURE — 255N000002 HC RX 255 OP 636: Performed by: INTERNAL MEDICINE

## 2021-07-15 RX ORDER — GADOBUTROL 604.72 MG/ML
7 INJECTION INTRAVENOUS ONCE
Status: COMPLETED | OUTPATIENT
Start: 2021-07-15 | End: 2021-07-15

## 2021-07-15 RX ADMIN — GADOBUTROL 7 ML: 604.72 INJECTION INTRAVENOUS at 14:27

## 2021-08-05 ENCOUNTER — VIRTUAL VISIT (OUTPATIENT)
Dept: GASTROENTEROLOGY | Facility: CLINIC | Age: 66
End: 2021-08-05
Payer: COMMERCIAL

## 2021-08-05 VITALS — WEIGHT: 135 LBS | BODY MASS INDEX: 21.19 KG/M2

## 2021-08-05 DIAGNOSIS — Z80.0 FAMILY HISTORY OF PANCREATIC CANCER: Primary | ICD-10-CM

## 2021-08-05 PROCEDURE — 99204 OFFICE O/P NEW MOD 45 MIN: CPT | Mod: 95 | Performed by: INTERNAL MEDICINE

## 2021-08-05 NOTE — PROGRESS NOTES
"    [facility]    The patient has been notified of following:     \"This video visit will be conducted via a call between you and your physician/provider. We have found that certain health care needs can be provided without the need for an in-person physical exam.  This service lets us provide the care you need with a video conversation.  If a prescription is necessary we can send it directly to your pharmacy.  If lab work is needed we can place an order for that and you can then stop by our lab to have the test done at a later time.    Video visits are billed at different rates depending on your insurance coverage.  Please reach out to your insurance provider with any questions.    If during the course of the call the physician/provider feels a video visit is not appropriate, you will not be charged for this service.\"    Patient has given verbal consent for Video visit? Yes  How would you like to obtain your AVS? My Chart  If you are dropped from the video visit, the video invite should be resent to: Cell phone  Will anyone else be joining your video visit? No  {If patient encounters technical issues they should call [phone]     Video-Visit Details    Type of service:  Video Visit    Video Start Time: 1100  Video End Time: 1145    Originating Location (pt. Location): Home    Distant Location (provider location):  [facility] [place]     Platform used for Video Visit: McPhy    Referring provider  Luigi Gill  Query Last seen >3y back, strong family history of pancreatic cancer    HPI  Mr Junior is a 63yo gentleman with a very strong family history of pancreatic cancer including 2 first degree and 4 third degree relatives who has been under surveillance of his pancreas by cross sectional imaging since 2017. Most recently, he had an MRI/MRCP in July which was unremarkable, without any abnormal finding of the pancreas. He " continues to not have any abdominal complaints or changes in bowels. He at times with smoke cigars while golfing and has upward of only 3 glasses a wine per week. His BMI is excellent at 22.    Review of Systems   ROS COMP: Constitutional, HEENT, cardiovascular, pulmonary, GI, , musculoskeletal, neuro, skin, endocrine and psych systems are negative, except as otherwise noted.    Medications    Past Medical  Past Medical History:   Diagnosis Date     Colon polyps 10/30/2013     Kidney stones 08/26/2015     Malignant neoplasm of prostate (H) 01/01/2007    adenocarcinoam of the prostate gland     Mixed hyperlipidemia 10/28/2008     Paget's disease of bone 10/12/2010    osteitis deformans without mention of bone tumor     Past Surgical  Past Surgical History:   Procedure Laterality Date     C LAPAROSCOPY, SURGICAL PROSTATECTOMY, RETROPUBIC RADICAL, W/NERVE SPARING  2007     COLONOSCOPY  10/20/2013     ENDOSCOPIC ULTRASOUND UPPER GASTROINTESTINAL TRACT (GI) N/A 8/28/2017    Procedure: ENDOSCOPIC ULTRASOUND, ESOPHAGOSCOPY / UPPER GASTROINTESTINAL TRACT (GI);  Upper Endoscopic Ultrasound;  Surgeon: Oliverio Tapia MD;  Location: [facility] OR     STRABISMUS SURGERY  1962     Social History  Social History     Socioeconomic History     Marital status:      Spouse name: Mary     Number of children: 2     Years of education: Not on file     Highest education level: Not on file   Occupational History     Occupation: Realtor   Tobacco Use     Smoking status: Current Some Day Smoker     Types: Cigars     Start date: 5/11/2007     Smokeless tobacco: Never Used     Tobacco comment: 2 cigars only while golfing, states he doesn't need help quitting   Substance and Sexual Activity     Alcohol use: Yes     Alcohol/week: 3.0 standard drinks     Types: 3 Glasses of wine per week     Comment: 3-4 glasses of weekly     Drug use: No     Sexual activity: Not on file   Other Topics Concern     Not on file   Social History  Narrative     Not on file     Social Determinants of Health     Financial Resource Strain:      Difficulty of Paying Living Expenses:    Food Insecurity:      Worried About Running Out of Food in the Last Year:      Ran Out of Food in the Last Year:    Transportation Needs:      Lack of Transportation (Medical):      Lack of Transportation (Non-Medical):    Physical Activity:      Days of Exercise per Week:      Minutes of Exercise per Session:    Stress:      Feeling of Stress :    Social Connections:      Frequency of Communication with Friends and Family:      Frequency of Social Gatherings with Friends and Family:      Attends Christian Services:      Active Member of Clubs or Organizations:      Attends Club or Organization Meetings:      Marital Status:    Intimate Partner Violence:      Fear of Current or Ex-Partner:      Emotionally Abused:      Physically Abused:      Sexually Abused:      Family History  Family History   Problem Relation Age of Onset     Pancreatic Cancer Father 58         at 59     Pancreatic Cancer Sister 65        nonsmoker, nondrinker, genetic test results pending (2017)     Other - See Comments Sister         Gilbert's Syndrome     Other - See Comments Brother 54        pancreatic cyst     Colon Polyps Brother 25     Brain Cancer Maternal Grandfather 76         at 77     Pancreatic Cancer Other         4 paternal great aunts with pancreatic cancer ranging grom 40s to 60s     Diabetes Daughter         Type 1 diabetes     Other - See Comments Sister         Gilbert's Syndrome     Colon Polyps Sister      Colon Polyps Brother 66     Objective:  Reported vitals:  There were no vitals taken for this visit.   GEN: Healthy, alert and no distress  PSYCH: Alert and oriented times 3; coherent speech, normal rate and volume, able to articulate logical thoughts, able to abstract reason, no tangential thoughts, no hallucinations or delusions, affect seems normal  RESP: No cough, no  audible wheezing, able to talk in full sentences  Remainder of exam unable to be completed due to virtual visit     Outside Imaging summaries:    Assessment and plan:  Mr Junior is a 65yo gentleman with a very strong family history of pancreatic cancer who remains under surveillance by noninasive cross sectional imaging. His most recent imaging, an MRI/MRCP with contrast, was earlier this month and his pancreas was found grossly healthy without any concerning features. He will remain under surveillance indefinitely, with his next test being another MRI/MRCP in one year. We did discuss modifiable risks including BMI, tobacco use and alcohol. With this in mind he will be judicious with use of these products and we endorsed optimizing his diet and having a daily exercise regimen (BMI <22).    He is up to date with his colon cancer screening/polyp surveillance. He will be due for another colonoscopy in 5 years.    It was a pleasure to participate in the care of this patient; please contact us with any further questions.  A total of at least 45 minutes was spent in evaluation with this patient, >50% of which was counseling regarding the above delineated issues.    Oliverio Tapia MD PhD FACG SALINA BAILEY  Director of Endoscopy  Associate Professor of Medicine, Surgery and Pediatrics  Interventional and Therapeutic Endoscopy    [facility]  Division of Gastroenterology and Hepatology  [facility] [address] [place], [place] [address]    New Consultations  [phone]  Procedure Scheduling [phone]  Clinical Nurse Coordinator [phone]  Clinical Fax   [phone]  Administrative   [phone]  Administrative Fax  [phone]

## 2021-08-05 NOTE — NURSING NOTE
Chief Complaint   Patient presents with     New Patient       Vitals:    08/05/21 1023   Weight: 61.2 kg (135 lb)       Body mass index is 21.19 kg/m .    Rosa oRsas CMA

## 2021-08-05 NOTE — LETTER
8/5/2021         RE: Pradeep Junior  [id number]  [facility] [address]        Dear Colleague,    Thank you for referring your patient, Pradeep Junior, to the [facility] [place]. Please see a copy of my visit note below.    [facility]    Referring provider  Luigi Gill  Query Last seen >3y back, strong family history of pancreatic cancer    HPI  Mr Junior is a 63yo gentleman with a very strong family history of pancreatic cancer including 2 first degree and 4 third degree relatives who has been under surveillance of his pancreas by cross sectional imaging since 2017. Most recently, he had an MRI/MRCP in July which was unremarkable, without any abnormal finding of the pancreas. He continues to not have any abdominal complaints or changes in bowels. He at times with smoke cigars while golfing and has upward of only 3 glasses a wine per week. His BMI is excellent at 22.    Review of Systems   ROS COMP: Constitutional, HEENT, cardiovascular, pulmonary, GI, , musculoskeletal, neuro, skin, endocrine and psych systems are negative, except as otherwise noted.    Medications    Past Medical  Past Medical History:   Diagnosis Date     Colon polyps 10/30/2013     Kidney stones 08/26/2015     Malignant neoplasm of prostate (H) 01/01/2007    adenocarcinoam of the prostate gland     Mixed hyperlipidemia 10/28/2008     Paget's disease of bone 10/12/2010    osteitis deformans without mention of bone tumor     Past Surgical  Past Surgical History:   Procedure Laterality Date     C LAPAROSCOPY, SURGICAL PROSTATECTOMY, RETROPUBIC RADICAL, W/NERVE SPARING  2007     COLONOSCOPY  10/20/2013     ENDOSCOPIC ULTRASOUND UPPER GASTROINTESTINAL TRACT (GI) N/A 8/28/2017    Procedure: ENDOSCOPIC ULTRASOUND, ESOPHAGOSCOPY / UPPER GASTROINTESTINAL TRACT (GI);  Upper Endoscopic Ultrasound;  Surgeon: Oliverio Tapia MD;  Location:  OR      STRABISMUS SURGERY  1962     Social History  Social History     Socioeconomic History     Marital status:      Spouse name: Mary     Number of children: 2     Years of education: Not on file     Highest education level: Not on file   Occupational History     Occupation: Realtor   Tobacco Use     Smoking status: Current Some Day Smoker     Types: Cigars     Start date: 2007     Smokeless tobacco: Never Used     Tobacco comment: 2 cigars only while golfing, states he doesn't need help quitting   Substance and Sexual Activity     Alcohol use: Yes     Alcohol/week: 3.0 standard drinks     Types: 3 Glasses of wine per week     Comment: 3-4 glasses of weekly     Drug use: No     Sexual activity: Not on file   Other Topics Concern     Not on file   Social History Narrative     Not on file     Social Determinants of Health     Financial Resource Strain:      Difficulty of Paying Living Expenses:    Food Insecurity:      Worried About Running Out of Food in the Last Year:      Ran Out of Food in the Last Year:    Transportation Needs:      Lack of Transportation (Medical):      Lack of Transportation (Non-Medical):    Physical Activity:      Days of Exercise per Week:      Minutes of Exercise per Session:    Stress:      Feeling of Stress :    Social Connections:      Frequency of Communication with Friends and Family:      Frequency of Social Gatherings with Friends and Family:      Attends Jehovah's witness Services:      Active Member of Clubs or Organizations:      Attends Club or Organization Meetings:      Marital Status:    Intimate Partner Violence:      Fear of Current or Ex-Partner:      Emotionally Abused:      Physically Abused:      Sexually Abused:      Family History  Family History   Problem Relation Age of Onset     Pancreatic Cancer Father 58         at 59     Pancreatic Cancer Sister 65        nonsmoker, nondrinker, genetic test results pending (2017)     Other - See Comments Sister          Gilbert's Syndrome     Other - See Comments Brother 54        pancreatic cyst     Colon Polyps Brother 25     Brain Cancer Maternal Grandfather 76         at 77     Pancreatic Cancer Other         4 paternal great aunts with pancreatic cancer ranging grom 40s to 60s     Diabetes Daughter         Type 1 diabetes     Other - See Comments Sister         Gilbert's Syndrome     Colon Polyps Sister      Colon Polyps Brother 66     Objective:  Reported vitals:  There were no vitals taken for this visit.   GEN: Healthy, alert and no distress  PSYCH: Alert and oriented times 3; coherent speech, normal rate and volume, able to articulate logical thoughts, able to abstract reason, no tangential thoughts, no hallucinations or delusions, affect seems normal  RESP: No cough, no audible wheezing, able to talk in full sentences  Remainder of exam unable to be completed due to virtual visit     Outside Imaging summaries:    Assessment and plan:  Mr Junior is a 65yo gentleman with a very strong family history of pancreatic cancer who remains under surveillance by noninasive cross sectional imaging. His most recent imaging, an MRI/MRCP with contrast, was earlier this month and his pancreas was found grossly healthy without any concerning features. He will remain under surveillance indefinitely, with his next test being another MRI/MRCP in one year. We did discuss modifiable risks including BMI, tobacco use and alcohol. With this in mind he will be judicious with use of these products and we endorsed optimizing his diet and having a daily exercise regimen (BMI <22).    He is up to date with his colon cancer screening/polyp surveillance. He will be due for another colonoscopy in 5 years.    It was a pleasure to participate in the care of this patient; please contact us with any further questions.  A total of at least 45 minutes was spent in evaluation with this patient, >50% of which was counseling regarding the above delineated  issues.    Oliverio Tapia MD PhD FACG SALINA BAILEY  Director of Endoscopy  Associate Professor of Medicine, Surgery and Pediatrics  Interventional and Therapeutic Endoscopy    [facility]  Division of Gastroenterology and Hepatology  [facility] [address] [place], [place] [address]    New Consultations  [phone]  Procedure Scheduling [phone]  Clinical Nurse Coordinator [phone]  Clinical Fax   [phone]  Administrative   [phone]  Administrative Fax  [phone]      Again, thank you for allowing me to participate in the care of your patient.      Sincerely,    Oliverio Tapia MD

## 2021-08-09 NOTE — PATIENT INSTRUCTIONS
Follow up:    Dr. Tapia has outlined the following steps after your recent clinic visit:     MRI/MRCP in one year    Please call with any questions or concerns regarding your clinic visit today.    It is a pleasure being involved in your health care.    Contacts post-consultation depending on your need:    Schedule Clinic Appointments            [phone] # 1   M-F 7:30 - 5 pm    Luz Yusuf RN Care Coordinator  [phone]    Geo Browning LPN    [phone]     OR Procedure Scheduling                             [phone]    My Chart is available 24 hours a day and is a secure way to access your records and communicate with your care team.  I strongly recommend signing up if you haven't already done so, if you are comfortable with computers.  If you would like to inquire about this or are having problems with My Chart access, you may call [phone] or go online at [email or web address].  Please allow at least 24 hours for a response and extra time on weekends and Holidays.

## 2021-10-09 ENCOUNTER — HEALTH MAINTENANCE LETTER (OUTPATIENT)
Age: 66
End: 2021-10-09

## 2022-07-16 ENCOUNTER — HEALTH MAINTENANCE LETTER (OUTPATIENT)
Age: 67
End: 2022-07-16

## 2022-09-11 ENCOUNTER — HEALTH MAINTENANCE LETTER (OUTPATIENT)
Age: 67
End: 2022-09-11

## 2023-07-29 ENCOUNTER — HEALTH MAINTENANCE LETTER (OUTPATIENT)
Age: 68
End: 2023-07-29

## 2023-08-09 DIAGNOSIS — Z80.0 FAMILY HISTORY OF PANCREATIC CANCER: Primary | ICD-10-CM

## 2023-08-23 ENCOUNTER — ANCILLARY PROCEDURE (OUTPATIENT)
Dept: MRI IMAGING | Facility: CLINIC | Age: 68
End: 2023-08-23
Attending: INTERNAL MEDICINE
Payer: MEDICARE

## 2023-08-23 DIAGNOSIS — Z80.0 FAMILY HISTORY OF PANCREATIC CANCER: ICD-10-CM

## 2023-08-23 PROCEDURE — G1010 CDSM STANSON: HCPCS

## 2023-08-23 PROCEDURE — 74183 MRI ABD W/O CNTR FLWD CNTR: CPT | Mod: MG

## 2023-08-23 PROCEDURE — 255N000002 HC RX 255 OP 636: Mod: JZ | Performed by: INTERNAL MEDICINE

## 2023-08-23 PROCEDURE — A9585 GADOBUTROL INJECTION: HCPCS | Mod: JZ | Performed by: INTERNAL MEDICINE

## 2023-08-23 RX ORDER — GADOBUTROL 604.72 MG/ML
6 INJECTION INTRAVENOUS ONCE
Status: COMPLETED | OUTPATIENT
Start: 2023-08-23 | End: 2023-08-23

## 2023-08-23 RX ADMIN — GADOBUTROL 6 ML: 604.72 INJECTION INTRAVENOUS at 14:07

## 2023-09-01 DIAGNOSIS — Z80.0 FAMILY HISTORY OF PANCREATIC CANCER: ICD-10-CM

## 2023-09-01 DIAGNOSIS — K86.2 PANCREATIC CYST: Primary | ICD-10-CM

## 2024-02-24 ENCOUNTER — HEALTH MAINTENANCE LETTER (OUTPATIENT)
Age: 69
End: 2024-02-24

## 2025-03-09 ENCOUNTER — HEALTH MAINTENANCE LETTER (OUTPATIENT)
Age: 70
End: 2025-03-09

## (undated) DEVICE — ENDO BITE BLOCK ADULT OMNI-BLOC

## (undated) DEVICE — PACK ENDOSCOPY GI CUSTOM UMMC

## (undated) DEVICE — SOL WATER IRRIG 1000ML BOTTLE 2F7114

## (undated) DEVICE — KIT ENDO FIRST STEP DISINFECTANT 200ML W/POUCH EP-4

## (undated) DEVICE — ENDO TUBING CO2 SMARTCAP STERILE DISP 100145CO2EXT

## (undated) DEVICE — TAPE CLOTH 3" CARDINAL 3TRCL03

## (undated) DEVICE — ENDO SYSTEM WATER BOTTLE & TUBING W/CO2 FILTER 00711549

## (undated) DEVICE — SPECIMEN CONTAINER 3OZ W/FORMALIN 59901

## (undated) DEVICE — SUCTION MANIFOLD DORNOCH ULTRA CART UL-CL500

## (undated) RX ORDER — LIDOCAINE HYDROCHLORIDE 20 MG/ML
INJECTION, SOLUTION EPIDURAL; INFILTRATION; INTRACAUDAL; PERINEURAL
Status: DISPENSED
Start: 2017-08-28

## (undated) RX ORDER — EPHEDRINE SULFATE 50 MG/ML
INJECTION, SOLUTION INTRAMUSCULAR; INTRAVENOUS; SUBCUTANEOUS
Status: DISPENSED
Start: 2017-08-28

## (undated) RX ORDER — PROPOFOL 10 MG/ML
INJECTION, EMULSION INTRAVENOUS
Status: DISPENSED
Start: 2017-08-28

## (undated) RX ORDER — GLYCOPYRROLATE 0.2 MG/ML
INJECTION, SOLUTION INTRAMUSCULAR; INTRAVENOUS
Status: DISPENSED
Start: 2017-08-28

## (undated) RX ORDER — FENTANYL CITRATE 50 UG/ML
INJECTION, SOLUTION INTRAMUSCULAR; INTRAVENOUS
Status: DISPENSED
Start: 2017-08-28

## (undated) RX ORDER — ONDANSETRON 2 MG/ML
INJECTION INTRAMUSCULAR; INTRAVENOUS
Status: DISPENSED
Start: 2017-08-28

## (undated) RX ORDER — PHENYLEPHRINE HCL IN 0.9% NACL 1 MG/10 ML
SYRINGE (ML) INTRAVENOUS
Status: DISPENSED
Start: 2017-08-28